# Patient Record
Sex: MALE | Race: WHITE | NOT HISPANIC OR LATINO | Employment: OTHER | URBAN - METROPOLITAN AREA
[De-identification: names, ages, dates, MRNs, and addresses within clinical notes are randomized per-mention and may not be internally consistent; named-entity substitution may affect disease eponyms.]

---

## 2019-02-27 ENCOUNTER — OCCUPATIONAL HEALTH (OUTPATIENT)
Dept: URGENT CARE | Facility: CLINIC | Age: 34
End: 2019-02-27

## 2019-02-27 PROCEDURE — 80305 PR DRUG SCREEN - 1: ICD-10-PCS | Mod: S$GLB,,, | Performed by: EMERGENCY MEDICINE

## 2019-02-27 PROCEDURE — 80305 DRUG TEST PRSMV DIR OPT OBS: CPT | Mod: S$GLB,,, | Performed by: EMERGENCY MEDICINE

## 2019-02-27 NOTE — PROGRESS NOTES
Drug screen   Problem: Patient Care Overview (Adult)  Goal: Plan of Care Review  Outcome: Ongoing (interventions implemented as appropriate)    10/12/17 1718   Coping/Psychosocial Response Interventions   Plan Of Care Reviewed With patient   Patient Care Overview   Progress improving         Problem: NPPV/CPAP (Adult)  Goal: Signs and Symptoms of Listed Potential Problems Will be Absent or Manageable (NPPV/CPAP)  Outcome: Ongoing (interventions implemented as appropriate)    10/12/17 1718   NPPV/CPAP   Problems Assessed (NPPV/CPAP) skin breakdown;hypoxia/hypoxemia   Problems Present (NPPV/CPAP) none

## 2019-04-08 ENCOUNTER — CLINICAL SUPPORT (OUTPATIENT)
Dept: URGENT CARE | Facility: CLINIC | Age: 34
End: 2019-04-08

## 2019-04-08 PROCEDURE — 99499 PR PHYSICAL - DOT/CDL: ICD-10-PCS | Mod: S$GLB,,, | Performed by: NURSE PRACTITIONER

## 2019-04-08 PROCEDURE — 99499 UNLISTED E&M SERVICE: CPT | Mod: S$GLB,,, | Performed by: NURSE PRACTITIONER

## 2019-10-12 ENCOUNTER — CLINICAL SUPPORT (OUTPATIENT)
Dept: URGENT CARE | Facility: CLINIC | Age: 34
End: 2019-10-12

## 2019-10-12 PROCEDURE — 80305 DRUG TEST PRSMV DIR OPT OBS: CPT | Mod: S$GLB,,, | Performed by: EMERGENCY MEDICINE

## 2019-10-12 PROCEDURE — 80305 PR DRUG SCREEN - 1: ICD-10-PCS | Mod: S$GLB,,, | Performed by: EMERGENCY MEDICINE

## 2020-07-10 ENCOUNTER — OCCUPATIONAL HEALTH (OUTPATIENT)
Dept: URGENT CARE | Facility: CLINIC | Age: 35
End: 2020-07-10

## 2020-07-10 DIAGNOSIS — Z02.89 ENCOUNTER FOR PHYSICAL EXAMINATION RELATED TO EMPLOYMENT: ICD-10-CM

## 2020-07-10 PROCEDURE — 99499 COAST GUARD PHYSICAL: ICD-10-PCS | Mod: S$GLB,,, | Performed by: NURSE PRACTITIONER

## 2020-07-10 PROCEDURE — 80305 PR DRUG SCREEN - 1: ICD-10-PCS | Mod: S$GLB,,, | Performed by: EMERGENCY MEDICINE

## 2020-07-10 PROCEDURE — 99499 UNLISTED E&M SERVICE: CPT | Mod: S$GLB,,, | Performed by: NURSE PRACTITIONER

## 2020-07-10 PROCEDURE — 80305 DRUG TEST PRSMV DIR OPT OBS: CPT | Mod: S$GLB,,, | Performed by: EMERGENCY MEDICINE

## 2020-12-08 ENCOUNTER — HOSPITAL ENCOUNTER (OUTPATIENT)
Facility: HOSPITAL | Age: 35
Discharge: HOME | End: 2020-12-09
Attending: EMERGENCY MEDICINE | Admitting: INTERNAL MEDICINE
Payer: COMMERCIAL

## 2020-12-08 DIAGNOSIS — I63.9 STROKE: ICD-10-CM

## 2020-12-08 DIAGNOSIS — G45.9 TIA (TRANSIENT ISCHEMIC ATTACK): ICD-10-CM

## 2020-12-08 DIAGNOSIS — G45.9 TRANSIENT ISCHEMIC ATTACK: Primary | ICD-10-CM

## 2020-12-08 LAB
ALBUMIN SERPL BCP-MCNC: 4.3 G/DL (ref 3.5–5.2)
ALP SERPL-CCNC: 38 U/L (ref 55–135)
ALT SERPL W/O P-5'-P-CCNC: 22 U/L (ref 10–44)
ANION GAP SERPL CALC-SCNC: 7 MMOL/L (ref 8–16)
AST SERPL-CCNC: 25 U/L (ref 10–40)
BASOPHILS # BLD AUTO: 0.03 K/UL (ref 0–0.2)
BASOPHILS NFR BLD: 0.3 % (ref 0–1.9)
BILIRUB SERPL-MCNC: 0.6 MG/DL (ref 0.1–1)
BUN SERPL-MCNC: 20 MG/DL (ref 6–20)
CALCIUM SERPL-MCNC: 9 MG/DL (ref 8.7–10.5)
CHLORIDE SERPL-SCNC: 104 MMOL/L (ref 95–110)
CHOLEST SERPL-MCNC: 243 MG/DL (ref 120–199)
CHOLEST/HDLC SERPL: 6.6 {RATIO} (ref 2–5)
CO2 SERPL-SCNC: 27 MMOL/L (ref 23–29)
CREAT SERPL-MCNC: 1.3 MG/DL (ref 0.5–1.4)
CREAT SERPL-MCNC: 1.3 MG/DL (ref 0.5–1.4)
DIFFERENTIAL METHOD: ABNORMAL
EOSINOPHIL # BLD AUTO: 0.1 K/UL (ref 0–0.5)
EOSINOPHIL NFR BLD: 1.2 % (ref 0–8)
ERYTHROCYTE [DISTWIDTH] IN BLOOD BY AUTOMATED COUNT: 12.1 % (ref 11.5–14.5)
EST. GFR  (AFRICAN AMERICAN): >60 ML/MIN/1.73 M^2
EST. GFR  (NON AFRICAN AMERICAN): >60 ML/MIN/1.73 M^2
GLUCOSE SERPL-MCNC: 106 MG/DL (ref 70–110)
GLUCOSE SERPL-MCNC: 90 MG/DL (ref 70–110)
HCT VFR BLD AUTO: 41.3 % (ref 40–54)
HDLC SERPL-MCNC: 37 MG/DL (ref 40–75)
HDLC SERPL: 15.2 % (ref 20–50)
HGB BLD-MCNC: 13.5 G/DL (ref 14–18)
IMM GRANULOCYTES # BLD AUTO: 0.03 K/UL (ref 0–0.04)
IMM GRANULOCYTES NFR BLD AUTO: 0.3 % (ref 0–0.5)
INR PPP: 1.2
LDLC SERPL CALC-MCNC: 160.4 MG/DL (ref 63–159)
LYMPHOCYTES # BLD AUTO: 3.1 K/UL (ref 1–4.8)
LYMPHOCYTES NFR BLD: 34.9 % (ref 18–48)
MCH RBC QN AUTO: 29.2 PG (ref 27–31)
MCHC RBC AUTO-ENTMCNC: 32.7 G/DL (ref 32–36)
MCV RBC AUTO: 89 FL (ref 82–98)
MONOCYTES # BLD AUTO: 0.9 K/UL (ref 0.3–1)
MONOCYTES NFR BLD: 9.7 % (ref 4–15)
NEUTROPHILS # BLD AUTO: 4.8 K/UL (ref 1.8–7.7)
NEUTROPHILS NFR BLD: 53.6 % (ref 38–73)
NONHDLC SERPL-MCNC: 206 MG/DL
NRBC BLD-RTO: 0 /100 WBC
PLATELET # BLD AUTO: 266 K/UL (ref 150–350)
PMV BLD AUTO: 9.8 FL (ref 9.2–12.9)
POTASSIUM SERPL-SCNC: 3.9 MMOL/L (ref 3.5–5.1)
PROT SERPL-MCNC: 7.2 G/DL (ref 6–8.4)
PROTHROMBIN TIME: 14.3 SEC (ref 10.6–14.8)
RBC # BLD AUTO: 4.63 M/UL (ref 4.6–6.2)
SAMPLE: NORMAL
SARS-COV-2 RDRP RESP QL NAA+PROBE: NEGATIVE
SODIUM SERPL-SCNC: 138 MMOL/L (ref 136–145)
TRIGL SERPL-MCNC: 228 MG/DL (ref 30–150)
TSH SERPL DL<=0.005 MIU/L-ACNC: 4.56 UIU/ML (ref 0.34–5.6)
WBC # BLD AUTO: 8.93 K/UL (ref 3.9–12.7)

## 2020-12-08 PROCEDURE — 25500020 PHARM REV CODE 255: Performed by: EMERGENCY MEDICINE

## 2020-12-08 PROCEDURE — 80061 LIPID PANEL: CPT

## 2020-12-08 PROCEDURE — 84443 ASSAY THYROID STIM HORMONE: CPT

## 2020-12-08 PROCEDURE — U0002 COVID-19 LAB TEST NON-CDC: HCPCS

## 2020-12-08 PROCEDURE — 85610 PROTHROMBIN TIME: CPT

## 2020-12-08 PROCEDURE — 93010 ELECTROCARDIOGRAM REPORT: CPT | Mod: ,,, | Performed by: INTERNAL MEDICINE

## 2020-12-08 PROCEDURE — 85025 COMPLETE CBC W/AUTO DIFF WBC: CPT

## 2020-12-08 PROCEDURE — 25000003 PHARM REV CODE 250: Performed by: EMERGENCY MEDICINE

## 2020-12-08 PROCEDURE — 93005 ELECTROCARDIOGRAM TRACING: CPT | Performed by: INTERNAL MEDICINE

## 2020-12-08 PROCEDURE — 80053 COMPREHEN METABOLIC PANEL: CPT

## 2020-12-08 PROCEDURE — 99285 EMERGENCY DEPT VISIT HI MDM: CPT | Mod: 25

## 2020-12-08 PROCEDURE — 93010 EKG 12-LEAD: ICD-10-PCS | Mod: ,,, | Performed by: INTERNAL MEDICINE

## 2020-12-08 PROCEDURE — 82962 GLUCOSE BLOOD TEST: CPT

## 2020-12-08 RX ORDER — NAPROXEN SODIUM 220 MG/1
162 TABLET, FILM COATED ORAL
Status: COMPLETED | OUTPATIENT
Start: 2020-12-08 | End: 2020-12-08

## 2020-12-08 RX ADMIN — IOHEXOL 100 ML: 350 INJECTION, SOLUTION INTRAVENOUS at 10:12

## 2020-12-08 RX ADMIN — ASPIRIN 162 MG: 81 TABLET, CHEWABLE ORAL at 10:12

## 2020-12-09 ENCOUNTER — CLINICAL SUPPORT (OUTPATIENT)
Dept: CARDIOLOGY | Facility: HOSPITAL | Age: 35
End: 2020-12-09
Attending: PSYCHIATRY & NEUROLOGY
Payer: COMMERCIAL

## 2020-12-09 VITALS
HEIGHT: 71 IN | BODY MASS INDEX: 36.82 KG/M2 | RESPIRATION RATE: 18 BRPM | HEART RATE: 62 BPM | OXYGEN SATURATION: 98 % | WEIGHT: 263 LBS | SYSTOLIC BLOOD PRESSURE: 142 MMHG | DIASTOLIC BLOOD PRESSURE: 52 MMHG | TEMPERATURE: 98 F

## 2020-12-09 PROBLEM — F12.90 MARIJUANA USE: Status: ACTIVE | Noted: 2020-12-09

## 2020-12-09 PROBLEM — E66.01 SEVERE OBESITY (BMI >= 40): Status: ACTIVE | Noted: 2020-12-09

## 2020-12-09 PROBLEM — E78.5 HYPERLIPIDEMIA: Status: ACTIVE | Noted: 2020-12-09

## 2020-12-09 PROBLEM — G45.9 TRANSIENT ISCHEMIC ATTACK: Status: ACTIVE | Noted: 2020-12-09

## 2020-12-09 PROBLEM — G45.9 TIA (TRANSIENT ISCHEMIC ATTACK): Status: ACTIVE | Noted: 2020-12-09

## 2020-12-09 LAB
APTT PPP: 31.8 SEC (ref 23.6–33.3)
CK MB SERPL-MCNC: 4.4 NG/ML (ref 0.1–6.5)
ESTIMATED AVG GLUCOSE: 114 MG/DL (ref 68–131)
HBA1C MFR BLD HPLC: 5.6 % (ref 4.5–6.2)
TROPONIN I SERPL DL<=0.01 NG/ML-MCNC: <0.03 NG/ML

## 2020-12-09 PROCEDURE — 25000003 PHARM REV CODE 250: Performed by: NURSE PRACTITIONER

## 2020-12-09 PROCEDURE — 83036 HEMOGLOBIN GLYCOSYLATED A1C: CPT

## 2020-12-09 PROCEDURE — 84484 ASSAY OF TROPONIN QUANT: CPT

## 2020-12-09 PROCEDURE — 97165 OT EVAL LOW COMPLEX 30 MIN: CPT

## 2020-12-09 PROCEDURE — 97116 GAIT TRAINING THERAPY: CPT

## 2020-12-09 PROCEDURE — 82553 CREATINE MB FRACTION: CPT

## 2020-12-09 PROCEDURE — 97161 PT EVAL LOW COMPLEX 20 MIN: CPT

## 2020-12-09 PROCEDURE — 93306 ECHO (CUPID ONLY): ICD-10-PCS | Mod: 26,,, | Performed by: INTERNAL MEDICINE

## 2020-12-09 PROCEDURE — 93306 TTE W/DOPPLER COMPLETE: CPT

## 2020-12-09 PROCEDURE — 93306 TTE W/DOPPLER COMPLETE: CPT | Mod: 26,,, | Performed by: INTERNAL MEDICINE

## 2020-12-09 PROCEDURE — G0378 HOSPITAL OBSERVATION PER HR: HCPCS

## 2020-12-09 PROCEDURE — 97535 SELF CARE MNGMENT TRAINING: CPT

## 2020-12-09 PROCEDURE — 85730 THROMBOPLASTIN TIME PARTIAL: CPT

## 2020-12-09 RX ORDER — ASPIRIN 325 MG
325 TABLET, DELAYED RELEASE (ENTERIC COATED) ORAL DAILY
Status: DISCONTINUED | OUTPATIENT
Start: 2020-12-09 | End: 2020-12-09 | Stop reason: HOSPADM

## 2020-12-09 RX ORDER — ATORVASTATIN CALCIUM 40 MG/1
40 TABLET, FILM COATED ORAL DAILY
Status: DISCONTINUED | OUTPATIENT
Start: 2020-12-09 | End: 2020-12-09 | Stop reason: HOSPADM

## 2020-12-09 RX ORDER — ASPIRIN 325 MG
325 TABLET ORAL DAILY
COMMUNITY

## 2020-12-09 RX ORDER — ONDANSETRON 2 MG/ML
4 INJECTION INTRAMUSCULAR; INTRAVENOUS EVERY 8 HOURS PRN
Status: DISCONTINUED | OUTPATIENT
Start: 2020-12-09 | End: 2020-12-09 | Stop reason: HOSPADM

## 2020-12-09 RX ORDER — SODIUM CHLORIDE 0.9 % (FLUSH) 0.9 %
10 SYRINGE (ML) INJECTION
Status: DISCONTINUED | OUTPATIENT
Start: 2020-12-09 | End: 2020-12-09 | Stop reason: HOSPADM

## 2020-12-09 RX ADMIN — ASPIRIN 325 MG: 325 TABLET, COATED ORAL at 09:12

## 2020-12-09 RX ADMIN — ATORVASTATIN CALCIUM 40 MG: 40 TABLET, FILM COATED ORAL at 09:12

## 2020-12-09 NOTE — PT/OT/SLP EVAL
Physical Therapy Evaluation and Discharge Note    Patient Name:  Sharif Olsen   MRN:  [id number]    Recommendations:     Discharge Recommendations:  home   Discharge Equipment Recommendations: none   Barriers to discharge: None    Assessment:     Sharif Olsen is a 35 y.o. male admitted with a medical diagnosis of TIA (transient ischemic attack). Pt supine in NAD in ED. Agreeable to PT eval and treatment; states resolution of R arm weakness and numbness.  At this time, patient is functioning at their prior level of function and does not require further acute PT services.     Recent Surgery: * No surgery found *      Plan:     During this hospitalization, patient does not require further acute PT services.  Please re-consult if situation changes.      Subjective     Chief Complaint: none  Patient/Family Comments/goals: home today  Pain/Comfort:  · Pain Rating 1: 0/10  · Pain Rating Post-Intervention 1: 0/10    Patients cultural, spiritual, Hinduism conflicts given the current situation:      Living Environment:  Pt lives with wife and kids in raised home with 15 steps to enter with HR; pt denies difficulty with ascending and descending and does not anticipate difficulty at discharge.   Prior to admission, patients level of function was Independent, driving and working.  Equipment used at home: none.  DME owned (not currently used): none.  Upon discharge, patient will have assistance from family.    Objective:     Communicated with RN and OT prior to session.  Patient found supine with blood pressure cuff, pulse ox (continuous), telemetry upon PT entry to room.    General Precautions: Standard, fall   Orthopedic Precautions:    Braces:       Exams:  · Cognitive Exam:  Patient is oriented to Person, Place, Time and Situation  · Gross Motor Coordination:  WFL  · RUE ROM: WNL  · RUE Strength: WNL  · LUE ROM: WNL  · LUE Strength: WNL  · RLE ROM: WNL  · RLE Strength: WNL  · LLE ROM: WNL  · LLE Strength: WNL    Functional  Mobility:  · Bed Mobility:     · Rolling Right: independence  · Supine to Sit: independence  · Transfers:     · Sit to Stand:  independence with no AD  · Gait: 30'  in room without AD Indep; no symptoms  · Balance: WNL in sitting; WNL in static stand, dynamic stand, and static stand with perturbations    AM-PAC 6 CLICK MOBILITY  Total Score:24       Therapeutic Activities and Exercises:   bed mobility; sitting EOB for trunk control and midline orientation; transfer training; PT educated pt/ family on importance of out of bed to chair and functional mobility to negate negative effects of prolonged bed rest. edu on signs and symptoms of CVA and ACT FAST for best prognosis.       AM-PAC 6 CLICK MOBILITY  Total Score:24     Patient left sitting at EOB with all lines intact, call button in reach and RN notified.    GOALS:   Multidisciplinary Problems     Physical Therapy Goals     Not on file                History:     History reviewed. No pertinent past medical history.    History reviewed. No pertinent surgical history.    Time Tracking:     PT Received On: 12/09/20  PT Start Time: 1015     PT Stop Time: 1026  PT Total Time (min): 11 min     Billable Minutes: Evaluation 3 and Gait Training 8      Yelitza Mcgill, PT  12/09/2020

## 2020-12-09 NOTE — CONSULTS
[facility]  Neurology  Consult Note    Patient Name: Sharif Olsen  MRN: [id number]  Admission Date: 12/8/2020  Hospital Length of Stay: 0 days  Code Status: Full Code   Attending Provider: No att. providers found   Consulting Provider: Terese Kelly NP  Primary Care Physician: Primary Doctor No  Principal Problem:TIA (transient ischemic attack)    Consults  Subjective:     HISTORY OF PRESENT ILLNESS: per emr   Sharif Olsen is a 35 y.o. old  male who  has no past medical history on file.. The patient presented to [facility] on 12/8/2020 with a primary complaint of Arm Numbness (reports right arm numbess approx 1 hour ago that resolved PTA. Denies weakness, headache, cp, SOB or blurred vision at present. pt reports taking 2 baby aspirin PTA. )        35-year-old male presented emergency department with right upper extremity weakness and numbness and could not  the right arm about 1 hour PTA and the symptoms lasted few minutes and spontaneously resolved.       Patient said he felt funny in his head and also felt funny in his right lower extremity when he had weakness in the right upper extremity.  Patient said he took two baby aspirins.  Patient denies any other complaints.       Patient states he is back to normal at this time and has no weakness or numbness at this time.  Denies headache or nausea vomiting or chest pain or shortness of breath or abdominal pain.  Denies trauma.  Denies any residual symptoms at this time.       Patient said he smokes and use marijuana daily and he has family history of strokes and ASD in his father and daughter      Neurological Consult: Pt seen and examined in ED.     History reviewed. No pertinent past medical history.    History reviewed. No pertinent surgical history.    Review of patient's allergies indicates:  No Known Allergies    Current Neurological Medications: none    No current facility-administered medications on file prior to  encounter.      Current Outpatient Medications on File Prior to Encounter   Medication Sig    aspirin 325 MG tablet Take 325 mg by mouth once daily.      Family History     Problem Relation (Age of Onset)    Atrial Septal Defect Father    Hypertension Mother, Father    Stroke Father        Tobacco Use    Smoking status: Former Smoker   Substance and Sexual Activity    Alcohol use: Not Currently    Drug use: Yes     Types: Marijuana     Comment: daily    Sexual activity: Not on file     Review of Systems   Neurological: Positive for numbness.   All other systems reviewed and are negative.    Objective:     Vital Signs (Most Recent):  Temp: 98.3 °F (36.8 °C) (12/09/20 1450)  Pulse: 62 (12/09/20 1450)  Resp: 18 (12/09/20 1450)  BP: (!) 142/52 (12/09/20 1450)  SpO2: 98 % (12/09/20 1450) Vital Signs (24h Range):  Temp:  [98.3 °F (36.8 °C)-98.7 °F (37.1 °C)] 98.3 °F (36.8 °C)  Pulse:  [54-67] 62  Resp:  [17-18] 18  SpO2:  [96 %-99 %] 98 %  BP: (130-154)/() 142/52     Weight: 119.3 kg (263 lb)  Body mass index is 36.68 kg/m².    Physical Exam  HENT:      Head: Normocephalic.      Nose: Nose normal.      Mouth/Throat:      Mouth: Mucous membranes are moist.   Eyes:      Pupils: Pupils are equal, round, and reactive to light.   Neck:      Musculoskeletal: Normal range of motion.   Pulmonary:      Effort: Pulmonary effort is normal.   Musculoskeletal: Normal range of motion.   Skin:     General: Skin is warm.   Neurological:      General: No focal deficit present.      Mental Status: He is alert and oriented to person, place, and time.      Coordination: Finger-Nose-Finger Test normal.      Deep Tendon Reflexes: Strength normal.   Psychiatric:         Speech: Speech normal.         NEUROLOGICAL EXAMINATION:     MENTAL STATUS   Oriented to person, place, and time.   Follows 1 step commands.   Attention: normal. Concentration: normal.   Speech: speech is normal   Level of consciousness: alert  Able to name object.  Able to repeat.     CRANIAL NERVES   Cranial nerves II through XII intact.     CN III, IV, VI   Pupils are equal, round, and reactive to light.    MOTOR EXAM     Strength   Strength 5/5 throughout.     SENSORY EXAM   Light touch normal.     GAIT AND COORDINATION      Coordination   Finger to nose coordination: normal    Tremor   Resting tremor: absent       karl     NIH Stroke Scale:    Level of Consciousness: 0 - alert  LOC Questions: 0 - answers both correctly  LOC Commands: 0 - performs both correctly  Best Gaze: 0 - normal  Visual: 0 - no visual loss  Facial Palsy: 0 - normal  Motor Left Arm: 0 - no drift  Motor Right Arm: 0 - no drift  Motor Left Le - no drift  Motor Right Le - no drift  Limb Ataxia: 0 - absent  Sensory: 0 - normal  Best Language: 0 - no aphasia  Dysarthria: 0 - normal articulation  Extinction and Inattention: 0 - no neglect  NIH Stroke Scale Total: 0          Significant Labs:  Lab Results   Component Value Date    CHOL 243 (H) 2020     Lab Results   Component Value Date    HDL 37 (L) 2020     Lab Results   Component Value Date    LDLCALC 160.4 (H) 2020     Lab Results   Component Value Date    TRIG 228 (H) 2020     Lab Results   Component Value Date    CHOLHDL 15.2 (L) 2020     Lab Results   Component Value Date    HGBA1C 5.6 2020       Significant Imaging: MRI Brain Without Contrast  PROCEDURE:    MRI BRAIN WITHOUT CONTRAST  dated  2020 6:58 AM    INDICATION: TIA, initial exam    COMPARISON: CT head 2020    TECHNIQUE: Multiplanar, multisequential MR imaging was performed of  the brain without contrast.    FINDINGS:    There are no signal abnormalities on diffusion weighted imaging to  suggest acute infarct. Brain parenchymal signal is normal. There is no  intracranial hemorrhage or extra-axial fluid collection. Ventricles  and cortical sulci are normal in size for the patient's age. The  midline structures and craniocervical  junction are normal. The major  intracranial physiologic flow voids are present. There are no signal  abnormalities of the orbits, the paranasal sinuses or the skull base.    IMPRESSION:  Normal.    Electronically Signed by Ana Sapp on 12/9/2020 7:38 AM  X-Ray Chest AP Portable  PROCEDURE:   XR CHEST AP PORTABLE  dated  12/8/2020 10:49 PM    CLINICAL HISTORY:   Male 35 years of age.   chest pain    TECHNIQUE: AP view of the chest obtained portably at 10:49 PM.    PREVIOUS STUDIES:  None Available    FINDINGS:    Cardiac and mediastinal contours are normal. Lungs are clear. There is  no pleural effusion or pneumothorax. Bones are unremarkable.    IMPRESSION:    No acute findings. Clear lungs. Normal size heart.    Electronically Signed by Ana Sapp on 12/9/2020 6:41 AM        Assessment and Plan:    RA numbness-resolved  Clinical TIA  -Ct head: negative  -MRI brain: normal  -CTA head and neck: unremarkable  -ECHO: pending results  -Lipids: ordered  -A1c: ordered    PLAN: work up in progress. Rec asa and statin for stroke prevention.        [phone] within 3 days from discharge.     Stroke education was provided including stroke risk factors modification and any acute neurological changes including weakness, confusion, visual changes to come straight to the ER.     All questions were answered.                                Active Diagnoses:    Diagnosis Date Noted POA    PRINCIPAL PROBLEM:  TIA (transient ischemic attack) [G45.9] 12/09/2020 Yes    Hyperlipidemia [E78.5] 12/09/2020 Yes    Severe obesity (BMI >= 40) [E66.01] 12/09/2020 Yes    Marijuana use [F12.90] 12/09/2020 Yes    Transient ischemic attack [G45.9] 12/09/2020 Yes      Problems Resolved During this Admission:       VTE Risk Mitigation (From admission, onward)         Ordered     IP VTE HIGH RISK PATIENT  Once      12/09/20 0031     Place sequential compression device  Until  discontinued      12/09/20 0031              Critical Care:  Greater than 30 minutes of critical care time has been spent evaluating with this patient. Time includes chart review not limited to diagnostic imaging, labs, and vitals, pt assessment, discussion with medical staff and nursing, current order evals, and new order entries.     Thank you for your consult. I will follow-up with patient. Please contact us if you have any additional questions.    Terese Kelly NP  Neurology  [facility]

## 2020-12-09 NOTE — ED NOTES
Patient left AMA. Follow up instructions discussed with patient. Patient verbalized understanding. All questions and concerns answered. No needs expressed at the time. Pt is awake, alert and oriented with no acute distress noted. Respirations even and unlabored. Ambulatory out of ED.

## 2020-12-09 NOTE — PT/OT/SLP EVAL
Occupational Therapy   Evaluation and Discharge Note    Name: Sharif Olsen  MRN: [id number]  Admitting Diagnosis:  TIA (transient ischemic attack)      Recommendations:     Discharge Recommendations: home  Discharge Equipment Recommendations:  none  Barriers to discharge:  None    Assessment:     Sharif Olsen is a 35 y.o. male with a medical diagnosis of TIA (transient ischemic attack). At this time, patient is functioning at their prior level of function and does not require further acute OT services.     Plan:     During this hospitalization, patient does not require further acute OT services.  Please re-consult if situation changes.    · Plan of Care Reviewed with: patient    Subjective     Chief Complaint: Right UE numbness and weakness.  Patient/Family Comments/goals: None, right UE numbness and weakness now resolved.    Occupational Profile:  Living Environment: lives with spouse and 3 children in a 2 story home with 15 steps to enter 1st floor.  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  Roles and Routines: primary homemaker  Equipment Used at home:  none  Assistance upon Discharge: Spouse    Pain/Comfort:  · Pain Rating 1: 0/10  · Pain Rating Post-Intervention 1: 0/10    Patients cultural, spiritual, Baptism conflicts given the current situation: no    Objective:     Communicated with: nurse prior to session.  Patient found HOB elevated with peripheral IV, telemetry upon OT entry to room.    General Precautions: Standard, (None)   Orthopedic Precautions:N/A   Braces: N/A     Occupational Performance:    Bed Mobility:    · Patient completed Scooting/Bridging with independence  · Patient completed Supine to Sit with independence  · Patient completed Sit to Supine with independence    Functional Mobility/Transfers:  · Patient completed Sit <> Stand Transfer with independence  with  no assistive device   · Functional Mobility: ambulated 15  feet in the ER room independently with no AD.    Activities of Daily Living:  · Grooming: independence to wash face sitting EOB.  · Lower Body Dressing: independence to don/doff socks sitting EOB.    Cognitive/Visual Perceptual:  Cognitive/Psychosocial Skills:     -       Oriented to: Person, Place, Time and Situation   -       Follows Commands/attention:Follows multistep  commands  -       Communication: clear/fluent  -       Memory: No Deficits noted  -       Safety awareness/insight to disability: intact   -       Mood/Affect/Coping skills/emotional control: Cooperative and Pleasant  Visual/Perceptual:      -Intact Acuity    Physical Exam:  Balance:    -       Sitting/Standing: WFL  Upper Extremity Range of Motion:     -       Right Upper Extremity: WFL  -       Left Upper Extremity: WFL  Upper Extremity Strength:    -       Right Upper Extremity: WFL  -       Left Upper Extremity: WFL   Strength:    -       Right Upper Extremity: WFL  -       Left Upper Extremity: WFL  Fine Motor Coordination:    -       Intact    AMPAC 6 Click ADL:  AMPAC Total Score: 24    Treatment & Education:  Patient is currently independent with all sitting/standing ADL activity with no safety concerns.  Education:    Patient left HOB elevated with all lines intact and call button in reach    GOALS:   Multidisciplinary Problems     Occupational Therapy Goals     Not on file                History:     History reviewed. No pertinent past medical history.    History reviewed. No pertinent surgical history.    Time Tracking:     OT Date of Treatment:    OT Start Time: 0945  OT Stop Time: 0959  OT Total Time (min): 14 min    Billable Minutes:Evaluation 5  Self Care/Home Management 9    Severiano Grande OT  12/9/2020

## 2020-12-09 NOTE — HOSPITAL COURSE
"12/9/2020  The right arm and neck numbness have resolved and he is feeling"ok". Per the patient has been seen by Neurology and PT.  Mr Olsen has decided to sign out against medical advice and clearly understands the risk of organ failure, loss of organs, disability and or death. He is aware that the echocardiogram has been done and the result is pending; also that he does have a room assignment.  Pt appears to be in no distress and just want to go now.    "

## 2020-12-09 NOTE — ED PROVIDER NOTES
Encounter Date: 12/8/2020       History     Chief Complaint   Patient presents with    Arm Numbness     reports right arm numbess approx 1 hour ago that resolved PTA. Denies weakness, headache, cp, SOB or blurred vision at present. pt reports taking 2 baby aspirin PTA.      35-year-old male presented emergency department with right upper extremity weakness and numbness and could not  the right arm about 1 hour ago and the symptoms lasted few minutes and spontaneously resolved.  Patient said he felt funny in his head and also felt funny in his right lower extremity when he had weakness in the right upper extremity.  Patient said he took 2 baby aspirins.  Patient denies any other complaints.  Patient states he is back to normal at this time and has no weakness or numbness at this time.  Denies headache or nausea vomiting or chest pain or shortness of breath or abdominal pain.  Denies any residual symptoms at this time.  Patient said he smokes and he has family history of strokes.        Review of patient's allergies indicates:  No Known Allergies  No past medical history on file.  No past surgical history on file.  No family history on file.  Social History     Tobacco Use    Smoking status: Not on file   Substance Use Topics    Alcohol use: Not on file    Drug use: Not on file     Review of Systems   Constitutional: Negative.    HENT: Negative.    Eyes: Negative.    Respiratory: Negative.    Cardiovascular: Negative.    Gastrointestinal: Negative.    Endocrine: Negative.    Genitourinary: Negative.    Musculoskeletal: Negative.    Skin: Negative.    Allergic/Immunologic: Negative.    Neurological: Positive for dizziness, weakness and numbness.   Hematological: Negative.    Psychiatric/Behavioral: Negative.    All other systems reviewed and are negative.      Physical Exam     Initial Vitals [12/08/20 2147]   BP Pulse Resp Temp SpO2   (!) 154/105 67 18 98.7 °F (37.1 °C) 98 %      MAP       --          Physical Exam    Nursing note and vitals reviewed.  Constitutional: He appears well-developed and well-nourished.   HENT:   Head: Normocephalic and atraumatic.   Nose: Nose normal.   Eyes: Conjunctivae and EOM are normal.   Neck: Normal range of motion. No tracheal deviation present.   Cardiovascular: Normal rate, regular rhythm, normal heart sounds and intact distal pulses. Exam reveals no friction rub.    No murmur heard.  Pulmonary/Chest: Breath sounds normal. No respiratory distress. He has no wheezes. He has no rales.   Abdominal: Soft. He exhibits no distension. There is no abdominal tenderness.   Musculoskeletal: Normal range of motion.   Neurological: He is alert and oriented to person, place, and time. He has normal strength. He displays normal reflexes. No cranial nerve deficit or sensory deficit. GCS score is 15. GCS eye subscore is 4. GCS verbal subscore is 5. GCS motor subscore is 6.   Skin: Skin is warm and dry. Capillary refill takes less than 2 seconds.   Psychiatric: He has a normal mood and affect. Thought content normal.         ED Course   Procedures  Labs Reviewed   CBC W/ AUTO DIFFERENTIAL - Abnormal; Notable for the following components:       Result Value    Hemoglobin 13.5 (*)     All other components within normal limits   COMPREHENSIVE METABOLIC PANEL - Abnormal; Notable for the following components:    Anion Gap 7 (*)     All other components within normal limits   PROTIME-INR   TSH   LIPID PANEL   SARS-COV-2 RNA AMPLIFICATION, QUAL   POCT GLUCOSE   ISTAT CREATININE   POCT GLUCOSE MONITORING CONTINUOUS   POCT CREATININE     EKG Readings: (Independently Interpreted)   Rhythm: Normal Sinus Rhythm. Ectopy: No Ectopy. Conduction: Normal. ST Segments: Normal ST Segments. T Waves: Normal. Clinical Impression: Normal Sinus Rhythm       Imaging Results          CTA Head and Neck (xpd) (In process)                X-Ray Chest AP Portable (In process)    Procedure changed from X-Ray Chest 1  View                CT Head Without Contrast (Final result)  Result time 12/08/20 22:02:00    Final result by Presley Duffy MD (12/08/20 22:02:00)                 Narrative:    EXAM DESCRIPTION: CT HEAD WITHOUT CONTRAST 12/8/2020 10:16 PM CST    CLINICAL HISTORY: 35 years, Male, Neuro deficit, acute, stroke suspected; Arm Numbness?(reports right arm numbess approx 1 hour ago that resolved PTA. Denies weakness, headache, cp, SOB or blurred vision at present. pt reports taking 2 baby aspirin PTA. )    COMPARISON: None.    FINDINGS:    Multiple transaxial tomograms of the brain were obtained from the base of the skull to the vertex without contrast. 2-D multiplanar reformats and the coronal and sagittal plane were performed and reviewed.  An individualized dose optimization technique, Automated Exposure Control, was utilized for the performed procedure.    Brain parenchyma as well as the gray and white matter differentiation demonstrate to be unremarkable. There is no midline shift and/or mass effect. There is no evidence for acute hemorrhage and/or evidence for acute infarction. No significant abnormal ossifications.  Lateral ventricles and cisterns displace normal appearance.  No intra or extra axial fluid collections were seen. The calvarium is intact with no evidence for fractures. The visualized portions of the paranasal sinuses and orbits demonstrate to be clear.    IMPRESSION:    NO ACUTE INTRACRANIAL HEMORRHAGE. GROSSLY UNREMARKABLE CT SCAN OF THE HEAD WITHOUT CONTRAST.    Electronically signed by:  Presley Duffy MD  12/8/2020 10:17 PM [facility] Workstation: [id number]                               Medical Decision Making:   Initial Assessment:   Critical care time not documented as patient did not meet criteria as patient not a candidate for tPA and symptoms completely resolved when patient arrived  Differential Diagnosis:   Patient with the symptoms of transient ischemic attack.  Symptoms completely resolved at  this time.  Case discussed with Dr. RYNE Kowalski.  CT scan unremarkable.  EKG unremarkable and as recommended by Dr. RYNE Kowalski CT of the head done and CTA done.  Screening labs reviewed and as patient completely asymptomatic other half of aspirin given in the emergency department.  Patient not a candidate for tPA as symptoms completely resolved and stroke score is 0 at this time.  Hospital Medicine consulted for evaluation  Clinical Tests:   Lab Tests: Reviewed  Radiological Study: Reviewed  Medical Tests: Reviewed                             Clinical Impression:       ICD-10-CM ICD-9-CM   1. Transient ischemic attack  G45.9 435.9   2. TIA (transient ischemic attack)  G45.9 435.9                          ED Disposition Condition    Admit                             Keny Pittman MD  12/08/20 2227       Keny Pittman MD  12/08/20 224

## 2020-12-09 NOTE — H&P
[facility] Medicine History & Physical Examination   Patient Name: Sharif Olsen  MRN: [id number]  Patient Class: OP- Observation   Admission Date: 12/8/2020  9:50 PM  Length of Stay: 0  Attending Physician:   Primary Care Provider: Primary Doctor No  Face-to-Face encounter date: 12/09/2020  Code Status:Full Code  MPOA:  Chief Complaint: Arm Numbness (reports right arm numbess approx 1 hour ago that resolved PTA. Denies weakness, headache, cp, SOB or blurred vision at present. pt reports taking 2 baby aspirin PTA. )        Patient information was obtained from patient, past medical records and ER records.   HISTORY OF PRESENT ILLNESS:   Sharif Olsen is a 35 y.o. old  male who  has no past medical history on file.. The patient presented to [facility] on 12/8/2020 with a primary complaint of Arm Numbness (reports right arm numbess approx 1 hour ago that resolved PTA. Denies weakness, headache, cp, SOB or blurred vision at present. pt reports taking 2 baby aspirin PTA. )       35-year-old male presented emergency department with right upper extremity weakness and numbness and could not  the right arm about 1 hour PTA and the symptoms lasted few minutes and spontaneously resolved.      Patient said he felt funny in his head and also felt funny in his right lower extremity when he had weakness in the right upper extremity.  Patient said he took two baby aspirins.  Patient denies any other complaints.      Patient states he is back to normal at this time and has no weakness or numbness at this time.  Denies headache or nausea vomiting or chest pain or shortness of breath or abdominal pain.  Denies trauma.  Denies any residual symptoms at this time.      Patient said he smokes and use marijuana daily and he has family history of strokes and ASD in his father and daughter    REVIEW OF SYSTEMS:   10 Point Review of System was performed and was found to be negative except for that  "mentioned already in the HPI and   Review of Systems (Negative unless checked off)  Review of Systems   Constitutional: Negative.    HENT: Negative.    Eyes: Negative.    Respiratory: Negative.    Cardiovascular: Negative.    Gastrointestinal: Negative.    Genitourinary: Negative.    Musculoskeletal: Negative.    Skin: Negative.    Neurological: Positive for focal weakness and weakness.   Endo/Heme/Allergies: Negative.    Psychiatric/Behavioral: Negative.            PAST MEDICAL HISTORY:   History reviewed. No pertinent past medical history.    PAST SURGICAL HISTORY:   History reviewed. No pertinent surgical history.    ALLERGIES:   Patient has no known allergies.    FAMILY HISTORY:     Family History   Problem Relation Age of Onset    Hypertension Mother     Hypertension Father     Stroke Father     Atrial Septal Defect Father        SOCIAL HISTORY:     Social History     Tobacco Use    Smoking status: Former Smoker   Substance Use Topics    Alcohol use: Not Currently        Social History     Substance and Sexual Activity   Sexual Activity Not on file        HOME MEDICATIONS:     Prior to Admission medications    Not on File         PHYSICAL EXAM:   /76   Pulse 60   Temp 98.7 °F (37.1 °C) (Oral)   Resp 17   Ht 5' 11" (1.803 m)   Wt 119.3 kg (263 lb)   SpO2 99%   BMI 36.68 kg/m²   Vitals Reviewed  General appearance: Well-developed, well-nourished male in no apparent distress.  Skin: No Rash.   Neuro: Motor and sensory exams grossly intact. Good tone. Power in all 4 extremities 5/5.   HENT: Atraumatic head. Moist mucous membranes of oral cavity.  Eyes: Normal extraocular movements.   Neck: Supple. No evidence of lymphadenopathy. No thyroidomegaly.  Lungs: Clear to auscultation bilaterally. No wheezing present.   Heart: Regular rate and rhythm. S1 and S2 present with no murmurs/gallop/rub. No pedal edema. No JVD present.   Abdomen: Soft, non-distended, non-tender. No rebound tenderness/guarding. " No masses or organomegaly. Bowel sounds are normal. Bladder is not palpable.   Extremities: No cyanosis, clubbing, or edema.  Psych/mental status: Alert and oriented. Cooperative. Responds appropriately to questions.   EMERGENCY DEPARTMENT LABS AND IMAGING:   Following labs were Reviewed   Recent Labs   Lab 12/08/20  2214   WBC 8.93   HGB 13.5*   HCT 41.3      CALCIUM 9.0   ALBUMIN 4.3   PROT 7.2      K 3.9   CO2 27      BUN 20   CREATININE 1.3   ALKPHOS 38*   ALT 22   AST 25   BILITOT 0.6         BMP:   Recent Labs   Lab 12/08/20  2214   GLU 90      K 3.9      CO2 27   BUN 20   CREATININE 1.3   CALCIUM 9.0   , CMP   Recent Labs   Lab 12/08/20  2214      K 3.9      CO2 27   GLU 90   BUN 20   CREATININE 1.3   CALCIUM 9.0   PROT 7.2   ALBUMIN 4.3   BILITOT 0.6   ALKPHOS 38*   AST 25   ALT 22   ANIONGAP 7*   ESTGFRAFRICA >60.0   EGFRNONAA >60.0   , CBC   Recent Labs   Lab 12/08/20  2214   WBC 8.93   HGB 13.5*   HCT 41.3      , INR   Lab Results   Component Value Date    INR 1.2 12/08/2020   , Lipid Panel   Lab Results   Component Value Date    CHOL 243 (H) 12/08/2020    HDL 37 (L) 12/08/2020    LDLCALC 160.4 (H) 12/08/2020    TRIG 228 (H) 12/08/2020    CHOLHDL 15.2 (L) 12/08/2020   , Troponin No results for input(s): TROPONINI in the last 168 hours., A1C: No results for input(s): HGBA1C in the last 4320 hours. and All labs within the past 24 hours have been reviewed  Microbiology Results (last 7 days)     ** No results found for the last 168 hours. **        CTA Head and Neck (xpd)   Final Result      X-Ray Chest AP Portable   ED Interpretation   Chest x-ray unremarkable      CT Head Without Contrast   Final Result      MRI Brain Without Contrast    (Results Pending)     Ct Head Without Contrast    Result Date: 12/8/2020  EXAM DESCRIPTION: CT HEAD WITHOUT CONTRAST 12/8/2020 10:16 PM CST CLINICAL HISTORY: 35 years, Male, Neuro deficit, acute, stroke suspected; Arm  Numbness?(reports right arm numbess approx 1 hour ago that resolved PTA. Denies weakness, headache, cp, SOB or blurred vision at present. pt reports taking 2 baby aspirin PTA. ) COMPARISON: None. FINDINGS: Multiple transaxial tomograms of the brain were obtained from the base of the skull to the vertex without contrast. 2-D multiplanar reformats and the coronal and sagittal plane were performed and reviewed. An individualized dose optimization technique, Automated Exposure Control, was utilized for the performed procedure. Brain parenchyma as well as the gray and white matter differentiation demonstrate to be unremarkable. There is no midline shift and/or mass effect. There is no evidence for acute hemorrhage and/or evidence for acute infarction. No significant abnormal ossifications.  Lateral ventricles and cisterns displace normal appearance.  No intra or extra axial fluid collections were seen. The calvarium is intact with no evidence for fractures. The visualized portions of the paranasal sinuses and orbits demonstrate to be clear. IMPRESSION: NO ACUTE INTRACRANIAL HEMORRHAGE. GROSSLY UNREMARKABLE CT SCAN OF THE HEAD WITHOUT CONTRAST. Electronically signed by:  Presley Duffy MD  12/8/2020 10:17 PM CST Workstation: [id number]    Cta Head And Neck (xpd)    Result Date: 12/8/2020  EXAM DESCRIPTION: CTA HEAD AND NECK (XPD) 12/8/2020 11:10 PM CST CLINICAL HISTORY: 35 years, Male, Neuro deficit, acute, stroke suspected COMPARISON: None. PROCEDURE: Multiple transaxial tomograms from the aortic arch through the brain were performed after administration of a 100 cc of Omnipaque 350 at a rate of 4.0 cc/s for complete opacification of the carotid arteries and intracranial vessels. Subsequent 2-D and 3-D multiplanar reformats, volume rendering technique and maximum intensity projection images were generated and reviewed An individualized dose optimization technique, Automated Exposure Control, was utilized for the performed  procedure. FINDINGS: Ascending aorta:  There is a normal branching pattern of the great vessels off the arch.  There are left vertebral artery dominance which demonstrate normal opacification of both vessels with no evidence for significant occlusion and/or stenosis.  No great vessel origin stenosis is identified. Right carotid artery:  Normal opacification is demonstrated within the right common carotid artery and at the carotid bifurcation. The right internal carotid artery demonstrate to be within normal limits. There is no evidence for significant plaque formation and/or significant stenosis. Left carotid artery:  Normal opacification is demonstrated within the left common carotid artery and at the carotid bifurcation. The left internal carotid artery demonstrate to be within normal limits. There is no evidence for significant plaque formation and/or significant stenosis. Intracranial circulation: Intracranial portions of the internal carotid arteries the cavernous sinus portions demonstrates no focal areas of significant. There is normal opacification within the anterior circulation without evidence of intracranial aneurysm.  The middle cerebral arteries, anterior cerebral arteries and its branches demonstrate normal opacification with no evidence for significant stenosis aneurysm and/or occlusion. There is normal venous drainage. Vertebrobasilar system: The posterior circulation demonstrate left vertebral artery dominance with smaller caliber right vertebral artery. There is no evidence for significant the stenosis and/or evidence for significant dissection. The vertebrobasilar system and PCA demonstrate to be normal with no evidence for aneurysm and/or occlusion. Grossly the brain parenchyma demonstrate normal gray-white matter differentiation with no evidence for mass effect and/or midline shift. The skull base and intracranial structures demonstrate to be within normal limits. Lung apex: No gross  abnormalities are noted within the apices. IMPRESSION: UNREMARKABLE CTA INTRACRANIAL VESSELS WITH NO EVIDENCE FOR SIGNIFICANT ANEURYSM, STENOSIS AND/OR OCCLUSION. GROSSLY UNREMARKABLE COMMON AND INTERNAL CAROTID ARTERIES. LEFT VERTEBRAL ARTERY DOMINANCE WITH SMALLER CALIBER RIGHT VERTEBRAL ARTERY WITH NO EVIDENCE FOR DISSECTION AND/OR STENOSIS. GROSSLY UNREMARKABLE BRAIN WITH CONTRAST. Electronically signed by:  Presley Duffy MD  12/8/2020 11:22 PM [facility] Workstation: [id number]    12 lead EKG reveals a normal sinus rhythm with a normal axis this good R-wave progression this ST changes in the inferior leads II/III and AVF rate 63  millisecond  ASSESSMENT & PLAN:   Sharif Olsen is a 35 y.o. male admitted for    1. TIA  - Consult Neurology Dr. Rosendo Verdugo  - MRI of Brain  -Stroke protocol  -ASA/Statin  - PT/OT/ST eval and treat  -2 Decho with bubble    2. Family Hx of ASD/PFO  - bubble study ordered  -trend cardiac enzymes and troponin    3. Obesity-BMI:36.68  - F/U with PCP with discharge to engage in wt reduction plan    4. Daily Marijuana use  - recreation use only    DVT Prophylaxis: will be placed on SCDs for DVT prophylaxis and will be advised to be as mobile as possible and sit in a chair as tolerated.   ________________________________________________________________________________    Discharge Planning and Disposition: No mobility needs. Ambulating well. Good social support system. Patient will be discharged in   Face-to-Face encounter date: 12/09/2020  Encounter included review of the medical records, interviewing and examining the patient face-to-face, discussion with family and other health care providers including emergency medicine physician, admission orders, interpreting lab/test results and formulating a plan of care.   Medical Decision Making during this encounter was  [_] Low Complexity  [_] Moderate Complexity  [x] High  Complexity  _________________________________________________________________________________    INPATIENT LIST OF MEDICATIONS     Current Facility-Administered Medications:     aspirin EC tablet 325 mg, 325 mg, Oral, Daily, Kia Levy NP    atorvastatin tablet 40 mg, 40 mg, Oral, Daily, Kia Levy NP    ondansetron injection 4 mg, 4 mg, Intravenous, Q8H PRN, Kia Levy NP    sodium chloride 0.9% flush 10 mL, 10 mL, Intravenous, PRN, Kia Levy NP  No current outpatient medications on file.      Scheduled Meds:  Continuous Infusions:  PRN Meds:.      Kia Levy  [facility] Hospitalist NP  12/09/2020

## 2020-12-09 NOTE — DISCHARGE SUMMARY
"[facility] Medicine  Discharge Summary      Patient Name: Sharif Olsen  MRN: [id number]  Admission Date: 12/8/2020  Hospital Length of Stay: 0 days  Discharge Date and Time:  12/09/2020 3:08 PM  Attending Physician: No att. providers found   Discharging Provider: Héctor Chacko MD  Primary Care Provider: Primary Doctor No      HPI:   Sharif Olsen is a 35 y.o. old  male who  has no past medical history on file.. The patient presented to [facility] on 12/8/2020 with a primary complaint of Arm Numbness (reports right arm numbess approx 1 hour ago that resolved PTA. Denies weakness, headache, cp, SOB or blurred vision at present. pt reports taking 2 baby aspirin PTA. )        35-year-old male presented emergency department with right upper extremity weakness and numbness and could not  the right arm about 1 hour PTA and the symptoms lasted few minutes and spontaneously resolved.       Patient said he felt funny in his head and also felt funny in his right lower extremity when he had weakness in the right upper extremity.  Patient said he took two baby aspirins.  Patient denies any other complaints.       Patient states he is back to normal at this time and has no weakness or numbness at this time.  Denies headache or nausea vomiting or chest pain or shortness of breath or abdominal pain.  Denies trauma.  Denies any residual symptoms at this time.       Patient said he smokes and use marijuana daily and he has family history of strokes and ASD in his father and daughter    * No surgery found *      Hospital Course:   12/9/2020  The right arm and neck numbness have resolved and he is feeling"ok". Per the patient has been seen by Neurology and PT.  Mr Olsen has decided to sign out against medical advice and clearly understands the risk of organ failure, loss of organs, disability and or death. He is aware that the echocardiogram has been done and the result is pending; also " that he does have a room assignment.  Pt appears to be in no distress and just want to go now.       Consults:   Consults (From admission, onward)        Status Ordering Provider     Inpatient consult to Hospitalist  Once     Provider:  Kia Carrasco NP    Acknowledged LOURDES CARTER     Inpatient consult to neurology  Once     Provider:  Glen Verdugo MD    Acknowledged LOURDES CARTER     Inpatient consult to Neurology  Once     Provider:  Glen Verdugo MD    Acknowledged KIA CARRASCO     Inpatient consult to Registered Dietitian/Nutritionist  Once     Provider:  (Not yet assigned)    Acknowledged KIA CARRASCO     IP consult to case management/social work  Once     Provider:  (Not yet assigned)    Acknowledged KIA CARRASCO          No new Assessment & Plan notes have been filed under this hospital service since the last note was generated.  Service: Hospital Medicine    Final Active Diagnoses:    Diagnosis Date Noted POA    PRINCIPAL PROBLEM:  TIA (transient ischemic attack) [G45.9] 12/09/2020 Yes    Hyperlipidemia [E78.5] 12/09/2020 Yes    Severe obesity (BMI >= 40) [E66.01] 12/09/2020 Yes    Marijuana use [F12.90] 12/09/2020 Yes    Transient ischemic attack [G45.9] 12/09/2020 Yes      Problems Resolved During this Admission:       Discharged Condition: against medical advice    Disposition: Home or Self Care    Follow Up:  Follow-up Information     Glen Verdugo MD In 3 days.    Specialties: Vascular Neurology, Neurology  Contact information:  [address]  [address]  [place] [address]  [phone]                 Patient Instructions:      Diet Cardiac     Activity as tolerated       Significant Diagnostic Studies: Labs:   BMP:   Recent Labs   Lab 12/08/20  2214   GLU 90      K 3.9      CO2 27   BUN 20   CREATININE 1.3   CALCIUM 9.0   , CMP   Recent Labs   Lab 12/08/20  2214      K 3.9      CO2 27   GLU 90   BUN 20   CREATININE 1.3   CALCIUM 9.0   PROT 7.2   ALBUMIN 4.3    BILITOT 0.6   ALKPHOS 38*   AST 25   ALT 22   ANIONGAP 7*   ESTGFRAFRICA >60.0   EGFRNONAA >60.0   , CBC   Recent Labs   Lab 12/08/20  2214   WBC 8.93   HGB 13.5*   HCT 41.3      , Lipid Panel   Lab Results   Component Value Date    CHOL 243 (H) 12/08/2020    HDL 37 (L) 12/08/2020    LDLCALC 160.4 (H) 12/08/2020    TRIG 228 (H) 12/08/2020    CHOLHDL 15.2 (L) 12/08/2020   , A1C:   Recent Labs   Lab 12/09/20  0451   HGBA1C 5.6    and All labs within the past 24 hours have been reviewed    Pending Diagnostic Studies:     Procedure Component Value Units Date/Time    Echo Color Flow Doppler? Yes; Bubble Contrast? Yes [[id number]] Resulted: 12/09/20 1026    Order Status: Sent Lab Status: In process Updated: 12/09/20 1026     BSA 2.44 m2      TDI SEPTAL 0.15 m/s      LV LATERAL E/E' RATIO 2.95 m/s      LV SEPTAL E/E' RATIO 3.93 m/s      Right Atrial Pressure (from IVC) 3 mmHg      AORTIC VALVE CUSP SEPERATION 2.42 cm      TDI LATERAL 0.20 m/s      PV PEAK VELOCITY 103.64 cm/s      LVIDd 5.52 cm      IVS 0.98 cm      Posterior Wall 0.98 cm      Ao root annulus 3.14 cm      LVIDs 3.74 cm      FS 32 %      LV mass 208.86 g      LA size 3.66 cm      Left Ventricle Relative Wall Thickness 0.36 cm      AV mean gradient 3 mmHg      AV valve area 4.39 cm2      AV Velocity Ratio 97.77     AV index (prosthetic) 1.16     E/A ratio 1.00     Mean e' 0.18 m/s      E wave decelartion time 292.57 msec      LVOT diameter 2.20 cm      LVOT area 3.8 cm2      LVOT peak juan 121.24 m/s      LVOT peak VTI 26.23 cm      Ao peak juan 1.24 m/s      Ao VTI 22.68 cm      LVOT stroke volume 99.66 cm3      AV peak gradient 6 mmHg      TV rest pulmonary artery pressure 23 mmHg      E/E' ratio 3.37 m/s      MV Peak E Juan 0.59 m/s      TR Max Juan 2.22 m/s      MV Peak A Juan 0.59 m/s      LV Systolic Volume 60.13 mL      LV Systolic Volume Index 25.4 mL/m2      LV Diastolic Volume 129.88 mL      LV Diastolic Volume Index 54.82 mL/m2      LV Mass  Index 88 g/m2      Triscuspid Valve Regurgitation Peak Gradient 20 mmHg     Narrative:      · The estimated PA systolic pressure is 23 mmHg.       Impression:               Medications:  Reconciled Home Medications:      Medication List      CONTINUE taking these medications    aspirin 325 MG tablet  Take 325 mg by mouth once daily.            Indwelling Lines/Drains at time of discharge:   Lines/Drains/Airways     None                 Time spent on the discharge of patient: 16 minutes  Patient was seen and examined on the date of discharge and determined to be suitable for discharge.         Héctor Chacko MD  Department of Hospital Medicine  [facility]

## 2020-12-09 NOTE — PROGRESS NOTES
"[facility] Medicine  Progress Note    Patient Name: Sharif Olsen  MRN: [id number]  Patient Class: OP- Observation   Admission Date: 12/8/2020  Length of Stay: 0 days  Attending Physician: Héctor Chacko MD  Primary Care Provider: Primary Doctor No        Subjective:     Principal Problem:TIA (transient ischemic attack)        HPI:  Sharif Olsen is a 35 y.o. old  male who  has no past medical history on file.. The patient presented to [facility] on 12/8/2020 with a primary complaint of Arm Numbness (reports right arm numbess approx 1 hour ago that resolved PTA. Denies weakness, headache, cp, SOB or blurred vision at present. pt reports taking 2 baby aspirin PTA. )        35-year-old male presented emergency department with right upper extremity weakness and numbness and could not  the right arm about 1 hour PTA and the symptoms lasted few minutes and spontaneously resolved.       Patient said he felt funny in his head and also felt funny in his right lower extremity when he had weakness in the right upper extremity.  Patient said he took two baby aspirins.  Patient denies any other complaints.       Patient states he is back to normal at this time and has no weakness or numbness at this time.  Denies headache or nausea vomiting or chest pain or shortness of breath or abdominal pain.  Denies trauma.  Denies any residual symptoms at this time.       Patient said he smokes and use marijuana daily and he has family history of strokes and ASD in his father and daughter    Overview/Hospital Course:  12/9/2020  Th right arm and neck numbness have resolved and he is feeling"ok". Per the patient has been seen by Neurology and PT.    Interval History: Pt has had resolution of the right arm and neck numbness.    Review of Systems   Constitutional: Positive for fatigue.   HENT: Negative.    Eyes: Negative.    Respiratory: Negative.    Cardiovascular: Negative.    Gastrointestinal: " Negative.    Endocrine: Negative.    Genitourinary: Negative.    Musculoskeletal: Negative.    Skin: Negative.    Allergic/Immunologic: Negative.    Neurological: Negative.    Hematological: Negative.    Psychiatric/Behavioral: Negative.      Objective:     Vital Signs (Most Recent):  Temp: 98.7 °F (37.1 °C) (12/08/20 2147)  Pulse: (!) 54 (12/09/20 0530)  Resp: 18 (12/09/20 0530)  BP: (!) 139/58 (12/09/20 0530)  SpO2: 96 % (12/09/20 0530) Vital Signs (24h Range):  Temp:  [98.7 °F (37.1 °C)] 98.7 °F (37.1 °C)  Pulse:  [54-67] 54  Resp:  [17-18] 18  SpO2:  [96 %-99 %] 96 %  BP: (130-154)/() 139/58     Weight: 119.3 kg (263 lb)  Body mass index is 36.68 kg/m².  No intake or output data in the 24 hours ending 12/09/20 1145   Physical Exam  Vitals signs and nursing note reviewed.   Constitutional:       Appearance: Normal appearance.   HENT:      Head: Normocephalic and atraumatic.      Nose: Nose normal.      Mouth/Throat:      Mouth: Mucous membranes are moist.   Eyes:      Extraocular Movements: Extraocular movements intact.      Pupils: Pupils are equal, round, and reactive to light.   Neck:      Musculoskeletal: Normal range of motion and neck supple.   Cardiovascular:      Rate and Rhythm: Normal rate and regular rhythm.   Pulmonary:      Effort: Pulmonary effort is normal.      Breath sounds: Normal breath sounds.   Abdominal:      General: Bowel sounds are normal. There is no distension.      Tenderness: There is no abdominal tenderness. There is no guarding.   Musculoskeletal: Normal range of motion.   Skin:     General: Skin is warm.   Neurological:      General: No focal deficit present.      Mental Status: He is alert and oriented to person, place, and time.   Psychiatric:         Mood and Affect: Mood normal.         Significant Labs:   Recent Lab Results       12/09/20  0451   12/08/20  2236   12/08/20  2224   12/08/20  2220   12/08/20  2214        Albumin         4.3     Alkaline Phosphatase          38     ALT         22     Anion Gap         7     aPTT 31.8  Comment:  Therapeutic Range of 67.5-105.1 secs.  Equivalent to Heparin Concentration of anti-Xa 0.3-0.7 IU/ml.               AST         25     Baso #         0.03     Basophil %         0.3     BILIRUBIN TOTAL         0.6  Comment:  For infants and newborns, interpretation of results should be based  on gestational age, weight and in agreement with clinical  observations.  Premature Infant recommended reference ranges:  Up to 24 hours.............<8.0 mg/dL  Up to 48 hours............<12.0 mg/dL  3-5 days..................<15.0 mg/dL  6-29 days.................<15.0 mg/dL       BUN         20     Calcium         9.0     Chloride         104     Cholesterol         243  Comment:  The National Cholesterol Education Program (NCEP) has set the  following guidelines (reference ranges) for Cholesterol:  Optimal.....................<200 mg/dL  Borderline High.............200-239 mg/dL  High........................> or = 240 mg/dL       CO2         27     CPK MB 4.4             Creatinine         1.3     Differential Method         Automated     eGFR if          >60.0     eGFR if non          >60.0  Comment:  Calculation used to obtain the estimated glomerular filtration  rate (eGFR) is the CKD-EPI equation.        Eos #         0.1     Eosinophil %         1.2     Estimated Avg Glucose 114             Glucose         90     Gran # (ANC)         4.8     Gran %         53.6     HDL         37  Comment:  The National Cholesterol Education Program (NCEP) has set the  following guidelines (reference values) for HDL Cholesterol:  Low...............<40 mg/dL  Optimal...........>60 mg/dL       HDL/Cholesterol Ratio         15.2     Hematocrit         41.3     Hemoglobin         13.5     Hemoglobin A1C External 5.6  Comment:  According to ADA guidelines, hemoglobin A1C <7.0% represents  optimal control in non-pregnant diabetic patients.   Different  metrics may apply to specific populations.   Standards of Medical Care in Diabetes - 2016.  For the purpose of screening for the presence of diabetes:  <5.7%     Consistent with the absence of diabetes  5.7-6.4%  Consistent with increasing risk for diabetes   (prediabetes)  >or=6.5%  Consistent with diabetes  Currently no consensus exists for use of hemoglobin A1C  for diagnosis of diabetes for children.               Immature Grans (Abs)         0.03  Comment:  Mild elevation in immature granulocytes is non specific and   can be seen in a variety of conditions including stress response,   acute inflammation, trauma and pregnancy. Correlation with other   laboratory and clinical findings is essential.       Immature Granulocytes         0.3     INR         1.2  Comment:  Coumadin Therapy:  INR: 2.0-3.0 conventional anticoagulation  INR: 2.5-3.5 intensive anticoagulation       LDL Cholesterol External         160.4  Comment:  The National Cholesterol Education Program (NCEP) has set the  following guidelines (reference values) for LDL Cholesterol:  Optimal.......................<130 mg/dL  Borderline High...............130-159 mg/dL  High..........................160-189 mg/dL  Very High.....................>190 mg/dL       Lymph #         3.1     Lymph %         34.9     MCH         29.2     MCHC         32.7     MCV         89     Mono #         0.9     Mono %         9.7     MPV         9.8     Non-HDL Cholesterol         206  Comment:  Risk category and Non-HDL cholesterol goals:  Coronary heart disease (CHD)or equivalent (10-year risk of CHD >20%):  Non-HDL cholesterol goal     <130 mg/dL  Two or more CHD risk factors and 10-year risk of CHD <= 20%:  Non-HDL cholesterol goal     <160 mg/dL  0 to 1 CHD risk factor:  Non-HDL cholesterol goal     <190 mg/dL       nRBC         0     Platelets         266     POC Creatinine   1.3           POC Glucose       106       Potassium         3.9     PROTEIN TOTAL          7.2     PT         14.3     RBC         4.63     RDW         12.1     Sample   VENOUS           SARS-CoV-2 RNA, Amplification, Qual     Negative  Comment:  This test utilizes isothermal nucleic acid amplification   technology to detect the SARS-CoV-2 RdRp nucleic acid segment.   The analytical sensitivity (limit of detection) is 125 genome   equivalents/mL.   A POSITIVE result implies infection with the SARS-CoV-2 virus;  the patient is presumed to be contagious.    A NEGATIVE result means that SARS-CoV-2 nucleic acids are not  present above the limit of detection. A NEGATIVE result should be   treated as presumptive. It does not rule out the possibility of   COVID-19 and should not be the sole basis for treatment decisions.   If COVID-19 is strongly suspected based on clinical and exposure   history, re-testing using an alternate molecular assay should be   considered.   This test is only for use under the Food and Drug   Administration s Emergency Use Authorization (EUA).   Commercial kits are provided by [employer].   Performance characteristics of the EUA have been independently  verified by [facility] Department of  Pathology and Laboratory Medicine.   _________________________________________________________________  The ID NOW COVID-19 Letter of Authorization, along with the   authorized Fact Sheet for Healthcare Providers, the authorized Fact  Sheet for Patients, and authorized labeling are available on the FDA   website:  www.fda.gov/MedicalDevices/Safety/EmergencySituations/kmu301661.htm           Sodium         138     Total Cholesterol/HDL Ratio         6.6     Triglycerides         228  Comment:  The National Cholesterol Education Program (NCEP) has set the  following guidelines (reference values) for triglycerides:  Normal......................<150 mg/dL  Borderline High.............150-199 mg/dL  High........................200-499 mg/dL       Troponin I <0.030              TSH         4.560     WBC         8.93                          Significant Imaging: I have reviewed all pertinent imaging results/findings within the past 24 hours.      Assessment/Plan:    12/9/2020  A)  Pt has had resolution of the right and neck arm numbness that caused him concern  P)  Continue neurology work up  No notes have been filed under this hospital service.  Service: Hospital Medicine    VTE Risk Mitigation (From admission, onward)         Ordered     IP VTE HIGH RISK PATIENT  Once      12/09/20 0031     Place sequential compression device  Until discontinued      12/09/20 0031                Discharge Planning   LIS:      Code Status: Full Code   Is the patient medically ready for discharge?:     Reason for patient still in hospital (select all that apply): Patient new problem, Treatment and Consult recommendations                     Héctor Chacko MD  Department of Hospital Medicine   [facility]

## 2020-12-09 NOTE — HPI
Sharif Olsen is a 35 y.o. old  male who  has no past medical history on file.. The patient presented to [facility] on 12/8/2020 with a primary complaint of Arm Numbness (reports right arm numbess approx 1 hour ago that resolved PTA. Denies weakness, headache, cp, SOB or blurred vision at present. pt reports taking 2 baby aspirin PTA. )        35-year-old male presented emergency department with right upper extremity weakness and numbness and could not  the right arm about 1 hour PTA and the symptoms lasted few minutes and spontaneously resolved.       Patient said he felt funny in his head and also felt funny in his right lower extremity when he had weakness in the right upper extremity.  Patient said he took two baby aspirins.  Patient denies any other complaints.       Patient states he is back to normal at this time and has no weakness or numbness at this time.  Denies headache or nausea vomiting or chest pain or shortness of breath or abdominal pain.  Denies trauma.  Denies any residual symptoms at this time.       Patient said he smokes and use marijuana daily and he has family history of strokes and ASD in his father and daughter

## 2020-12-09 NOTE — SUBJECTIVE & OBJECTIVE
Interval History: Pt has had resolution of the right arm and neck numbness.    Review of Systems   Constitutional: Positive for fatigue.   HENT: Negative.    Eyes: Negative.    Respiratory: Negative.    Cardiovascular: Negative.    Gastrointestinal: Negative.    Endocrine: Negative.    Genitourinary: Negative.    Musculoskeletal: Negative.    Skin: Negative.    Allergic/Immunologic: Negative.    Neurological: Negative.    Hematological: Negative.    Psychiatric/Behavioral: Negative.      Objective:     Vital Signs (Most Recent):  Temp: 98.7 °F (37.1 °C) (12/08/20 2147)  Pulse: (!) 54 (12/09/20 0530)  Resp: 18 (12/09/20 0530)  BP: (!) 139/58 (12/09/20 0530)  SpO2: 96 % (12/09/20 0530) Vital Signs (24h Range):  Temp:  [98.7 °F (37.1 °C)] 98.7 °F (37.1 °C)  Pulse:  [54-67] 54  Resp:  [17-18] 18  SpO2:  [96 %-99 %] 96 %  BP: (130-154)/() 139/58     Weight: 119.3 kg (263 lb)  Body mass index is 36.68 kg/m².  No intake or output data in the 24 hours ending 12/09/20 1145   Physical Exam  Vitals signs and nursing note reviewed.   Constitutional:       Appearance: Normal appearance.   HENT:      Head: Normocephalic and atraumatic.      Nose: Nose normal.      Mouth/Throat:      Mouth: Mucous membranes are moist.   Eyes:      Extraocular Movements: Extraocular movements intact.      Pupils: Pupils are equal, round, and reactive to light.   Neck:      Musculoskeletal: Normal range of motion and neck supple.   Cardiovascular:      Rate and Rhythm: Normal rate and regular rhythm.   Pulmonary:      Effort: Pulmonary effort is normal.      Breath sounds: Normal breath sounds.   Abdominal:      General: Bowel sounds are normal. There is no distension.      Tenderness: There is no abdominal tenderness. There is no guarding.   Musculoskeletal: Normal range of motion.   Skin:     General: Skin is warm.   Neurological:      General: No focal deficit present.      Mental Status: He is alert and oriented to person, place, and time.    Psychiatric:         Mood and Affect: Mood normal.         Significant Labs:   Recent Lab Results       12/09/20  0451   12/08/20  2236   12/08/20  2224   12/08/20  2220   12/08/20  2214        Albumin         4.3     Alkaline Phosphatase         38     ALT         22     Anion Gap         7     aPTT 31.8  Comment:  Therapeutic Range of 67.5-105.1 secs.  Equivalent to Heparin Concentration of anti-Xa 0.3-0.7 IU/ml.               AST         25     Baso #         0.03     Basophil %         0.3     BILIRUBIN TOTAL         0.6  Comment:  For infants and newborns, interpretation of results should be based  on gestational age, weight and in agreement with clinical  observations.  Premature Infant recommended reference ranges:  Up to 24 hours.............<8.0 mg/dL  Up to 48 hours............<12.0 mg/dL  3-5 days..................<15.0 mg/dL  6-29 days.................<15.0 mg/dL       BUN         20     Calcium         9.0     Chloride         104     Cholesterol         243  Comment:  The National Cholesterol Education Program (NCEP) has set the  following guidelines (reference ranges) for Cholesterol:  Optimal.....................<200 mg/dL  Borderline High.............200-239 mg/dL  High........................> or = 240 mg/dL       CO2         27     CPK MB 4.4             Creatinine         1.3     Differential Method         Automated     eGFR if          >60.0     eGFR if non          >60.0  Comment:  Calculation used to obtain the estimated glomerular filtration  rate (eGFR) is the CKD-EPI equation.        Eos #         0.1     Eosinophil %         1.2     Estimated Avg Glucose 114             Glucose         90     Gran # (ANC)         4.8     Gran %         53.6     HDL         37  Comment:  The National Cholesterol Education Program (NCEP) has set the  following guidelines (reference values) for HDL Cholesterol:  Low...............<40 mg/dL  Optimal...........>60 mg/dL        HDL/Cholesterol Ratio         15.2     Hematocrit         41.3     Hemoglobin         13.5     Hemoglobin A1C External 5.6  Comment:  According to ADA guidelines, hemoglobin A1C <7.0% represents  optimal control in non-pregnant diabetic patients.  Different  metrics may apply to specific populations.   Standards of Medical Care in Diabetes - 2016.  For the purpose of screening for the presence of diabetes:  <5.7%     Consistent with the absence of diabetes  5.7-6.4%  Consistent with increasing risk for diabetes   (prediabetes)  >or=6.5%  Consistent with diabetes  Currently no consensus exists for use of hemoglobin A1C  for diagnosis of diabetes for children.               Immature Grans (Abs)         0.03  Comment:  Mild elevation in immature granulocytes is non specific and   can be seen in a variety of conditions including stress response,   acute inflammation, trauma and pregnancy. Correlation with other   laboratory and clinical findings is essential.       Immature Granulocytes         0.3     INR         1.2  Comment:  Coumadin Therapy:  INR: 2.0-3.0 conventional anticoagulation  INR: 2.5-3.5 intensive anticoagulation       LDL Cholesterol External         160.4  Comment:  The National Cholesterol Education Program (NCEP) has set the  following guidelines (reference values) for LDL Cholesterol:  Optimal.......................<130 mg/dL  Borderline High...............130-159 mg/dL  High..........................160-189 mg/dL  Very High.....................>190 mg/dL       Lymph #         3.1     Lymph %         34.9     MCH         29.2     MCHC         32.7     MCV         89     Mono #         0.9     Mono %         9.7     MPV         9.8     Non-HDL Cholesterol         206  Comment:  Risk category and Non-HDL cholesterol goals:  Coronary heart disease (CHD)or equivalent (10-year risk of CHD >20%):  Non-HDL cholesterol goal     <130 mg/dL  Two or more CHD risk factors and 10-year risk of CHD <= 20%:  Non-HDL  cholesterol goal     <160 mg/dL  0 to 1 CHD risk factor:  Non-HDL cholesterol goal     <190 mg/dL       nRBC         0     Platelets         266     POC Creatinine   1.3           POC Glucose       106       Potassium         3.9     PROTEIN TOTAL         7.2     PT         14.3     RBC         4.63     RDW         12.1     Sample   VENOUS           SARS-CoV-2 RNA, Amplification, Qual     Negative  Comment:  This test utilizes isothermal nucleic acid amplification   technology to detect the SARS-CoV-2 RdRp nucleic acid segment.   The analytical sensitivity (limit of detection) is 125 genome   equivalents/mL.   A POSITIVE result implies infection with the SARS-CoV-2 virus;  the patient is presumed to be contagious.    A NEGATIVE result means that SARS-CoV-2 nucleic acids are not  present above the limit of detection. A NEGATIVE result should be   treated as presumptive. It does not rule out the possibility of   COVID-19 and should not be the sole basis for treatment decisions.   If COVID-19 is strongly suspected based on clinical and exposure   history, re-testing using an alternate molecular assay should be   considered.   This test is only for use under the Food and Drug   Administration s Emergency Use Authorization (EUA).   Commercial kits are provided by [employer].   Performance characteristics of the EUA have been independently  verified by [facility] Department of  Pathology and Laboratory Medicine.   _________________________________________________________________  The ID NOW COVID-19 Letter of Authorization, along with the   authorized Fact Sheet for Healthcare Providers, the authorized Fact  Sheet for Patients, and authorized labeling are available on the FDA   website:  www.fda.gov/MedicalDevices/Safety/EmergencySituations/ljy297268.htm           Sodium         138     Total Cholesterol/HDL Ratio         6.6     Triglycerides         228  Comment:  The National Cholesterol Education  Program (NCEP) has set the  following guidelines (reference values) for triglycerides:  Normal......................<150 mg/dL  Borderline High.............150-199 mg/dL  High........................200-499 mg/dL       Troponin I <0.030             TSH         4.560     WBC         8.93                          Significant Imaging: I have reviewed all pertinent imaging results/findings within the past 24 hours.

## 2020-12-10 LAB
AORTIC ROOT ANNULUS: 3.14 CM
AORTIC VALVE CUSP SEPERATION: 2.42 CM
AV INDEX (PROSTH): 1.16
AV MEAN GRADIENT: 3 MMHG
AV PEAK GRADIENT: 6 MMHG
AV VALVE AREA: 4.39 CM2
AV VELOCITY RATIO: 97.77
BSA FOR ECHO PROCEDURE: 2.44 M2
CV ECHO LV RWT: 0.36 CM
DOP CALC AO PEAK VEL: 1.24 M/S
DOP CALC AO VTI: 22.68 CM
DOP CALC LVOT AREA: 3.8 CM2
DOP CALC LVOT DIAMETER: 2.2 CM
DOP CALC LVOT PEAK VEL: 121.24 M/S
DOP CALC LVOT STROKE VOLUME: 99.66 CM3
DOP CALCLVOT PEAK VEL VTI: 26.23 CM
E WAVE DECELERATION TIME: 292.57 MSEC
E/A RATIO: 1
E/E' RATIO: 3.37 M/S
ECHO LV POSTERIOR WALL: 0.98 CM (ref 0.6–1.1)
FRACTIONAL SHORTENING: 32 % (ref 28–44)
INTERVENTRICULAR SEPTUM: 0.98 CM (ref 0.6–1.1)
LEFT ATRIUM SIZE: 3.66 CM
LEFT INTERNAL DIMENSION IN SYSTOLE: 3.74 CM (ref 2.1–4)
LEFT VENTRICLE DIASTOLIC VOLUME INDEX: 54.82 ML/M2
LEFT VENTRICLE DIASTOLIC VOLUME: 129.88 ML
LEFT VENTRICLE MASS INDEX: 88 G/M2
LEFT VENTRICLE SYSTOLIC VOLUME INDEX: 25.4 ML/M2
LEFT VENTRICLE SYSTOLIC VOLUME: 60.13 ML
LEFT VENTRICULAR INTERNAL DIMENSION IN DIASTOLE: 5.52 CM (ref 3.5–6)
LEFT VENTRICULAR MASS: 208.86 G
LV LATERAL E/E' RATIO: 2.95 M/S
LV SEPTAL E/E' RATIO: 3.93 M/S
MV PEAK A VEL: 0.59 M/S
MV PEAK E VEL: 0.59 M/S
PISA TR MAX VEL: 2.22 M/S
PV PEAK VELOCITY: 103.64 CM/S
RA PRESSURE: 3 MMHG
TDI LATERAL: 0.2 M/S
TDI SEPTAL: 0.15 M/S
TDI: 0.18 M/S
TR MAX PG: 20 MMHG
TV REST PULMONARY ARTERY PRESSURE: 23 MMHG

## 2020-12-11 ENCOUNTER — OFFICE VISIT (OUTPATIENT)
Dept: FAMILY MEDICINE | Facility: CLINIC | Age: 35
End: 2020-12-11
Payer: COMMERCIAL

## 2020-12-11 ENCOUNTER — TELEPHONE (OUTPATIENT)
Dept: CARDIOLOGY | Facility: CLINIC | Age: 35
End: 2020-12-11

## 2020-12-11 VITALS
HEART RATE: 71 BPM | BODY MASS INDEX: 37.56 KG/M2 | TEMPERATURE: 98 F | SYSTOLIC BLOOD PRESSURE: 102 MMHG | HEIGHT: 71 IN | WEIGHT: 268.31 LBS | OXYGEN SATURATION: 95 % | DIASTOLIC BLOOD PRESSURE: 74 MMHG

## 2020-12-11 DIAGNOSIS — I50.20 HFREF (HEART FAILURE WITH REDUCED EJECTION FRACTION): ICD-10-CM

## 2020-12-11 DIAGNOSIS — G45.9 TIA (TRANSIENT ISCHEMIC ATTACK): ICD-10-CM

## 2020-12-11 DIAGNOSIS — Z09 HOSPITAL DISCHARGE FOLLOW-UP: Primary | ICD-10-CM

## 2020-12-11 PROCEDURE — 3008F BODY MASS INDEX DOCD: CPT | Mod: CPTII,S$GLB,, | Performed by: STUDENT IN AN ORGANIZED HEALTH CARE EDUCATION/TRAINING PROGRAM

## 2020-12-11 PROCEDURE — 99999 PR PBB SHADOW E&M-EST. PATIENT-LVL IV: ICD-10-PCS | Mod: PBBFAC,,, | Performed by: STUDENT IN AN ORGANIZED HEALTH CARE EDUCATION/TRAINING PROGRAM

## 2020-12-11 PROCEDURE — 99204 PR OFFICE/OUTPT VISIT, NEW, LEVL IV, 45-59 MIN: ICD-10-PCS | Mod: S$GLB,,, | Performed by: STUDENT IN AN ORGANIZED HEALTH CARE EDUCATION/TRAINING PROGRAM

## 2020-12-11 PROCEDURE — 99204 OFFICE O/P NEW MOD 45 MIN: CPT | Mod: S$GLB,,, | Performed by: STUDENT IN AN ORGANIZED HEALTH CARE EDUCATION/TRAINING PROGRAM

## 2020-12-11 PROCEDURE — 99999 PR PBB SHADOW E&M-EST. PATIENT-LVL IV: CPT | Mod: PBBFAC,,, | Performed by: STUDENT IN AN ORGANIZED HEALTH CARE EDUCATION/TRAINING PROGRAM

## 2020-12-11 PROCEDURE — 1126F PR PAIN SEVERITY QUANTIFIED, NO PAIN PRESENT: ICD-10-PCS | Mod: S$GLB,,, | Performed by: STUDENT IN AN ORGANIZED HEALTH CARE EDUCATION/TRAINING PROGRAM

## 2020-12-11 PROCEDURE — 1126F AMNT PAIN NOTED NONE PRSNT: CPT | Mod: S$GLB,,, | Performed by: STUDENT IN AN ORGANIZED HEALTH CARE EDUCATION/TRAINING PROGRAM

## 2020-12-11 PROCEDURE — 3008F PR BODY MASS INDEX (BMI) DOCUMENTED: ICD-10-PCS | Mod: CPTII,S$GLB,, | Performed by: STUDENT IN AN ORGANIZED HEALTH CARE EDUCATION/TRAINING PROGRAM

## 2020-12-11 RX ORDER — ATORVASTATIN CALCIUM 40 MG/1
40 TABLET, FILM COATED ORAL DAILY
Qty: 90 TABLET | Refills: 3 | Status: SHIPPED | OUTPATIENT
Start: 2020-12-11 | End: 2021-12-11

## 2020-12-11 RX ORDER — SACUBITRIL AND VALSARTAN 24; 26 MG/1; MG/1
1 TABLET, FILM COATED ORAL 2 TIMES DAILY
Qty: 60 TABLET | Refills: 0 | Status: SHIPPED | OUTPATIENT
Start: 2020-12-11 | End: 2021-01-11 | Stop reason: SDUPTHER

## 2020-12-11 NOTE — TELEPHONE ENCOUNTER
Patient has appointment  with another cardiologist   Needs to see the Neuro [facility] said 2-3 business days for him to be seen

## 2020-12-11 NOTE — PATIENT INSTRUCTIONS
No caffeine, no more marijuana.     I recommend a heart healthy diet rich in fiber, fresh vegetables and fruit and low in saturated fats (fried foods, red meat, etc.).  The Mediterranean diet is probably the best for your health with a plant based diet high in nuts, beans, olive oil and a few servings a week of fish. You do not need to eat a meat at every meal to be healthy!        What is Heart Failure?  The heart is a muscle that pumps oxygen-rich blood to all parts of the body. When you have heart failure, the heart is not able to pump as well as it should. Blood and fluid may back up into the lungs, and some parts of the body dont get enough oxygen-rich blood to work normally. These problems lead to the symptoms you feel.  When you have heart failure  With heart failure, not enough oxygen-rich blood leaves the heart with each beat. There are 2 types of heart failure. Both affect the ventricles ability to pump blood. You may have 1 or both types.       Systolic heart failure. The heart muscle becomes weak and enlarged. It cant pump enough oxygen-rich blood forward to the rest of the body when the ventricles contract. The measurement of how much blood your heart pushes out when it beats is called ejection fraction. In systolic heart failure, the ejection fraction is lower than normal. This can cause blood to back up into the lungs and cause shortness of breath and eventually ankle swelling (edema).  This is also called heart failure with reduced ejection fraction, or  HFrEF.    Diastolic heart failure. The heart muscle becomes stiff. It doesnt relax normally between contractions, which keeps the ventricles from filling with blood. Ejection fraction is often in the normal range. This can still lead to the backup of blood into the body and affect the organs such as the liver. This is also called heart failure with preserved ejection fraction or HFpEF.     Recognizing heart failure symptoms  When you have heart  failure, you need to pay close attention to your body and how you feel, every single day. That way, if a problem occurs, you can get help before it becomes too severe. You'll need to watch for changes in your symptoms. As long as symptoms stay about the same from one day to the next, your heart failure is stable. But if symptoms start to get worse, it's time to take action.  Signs and symptoms of worsening heart failure include:  · Rapid weight gain  · Shortness of breath  · Swelling (edema)  · Fatigue  How heart failure affects your body  When the heart doesn't pump enough blood, hormones (body chemicals) are sent to increase the amount of work the heart does. Some hormones make the heart grow larger. Others tell the heart to pump faster. As a result, the heart may pump more blood at first, but it can't keep up with the ongoing demands. So, the heart muscle becomes even more weak. Over time, even less blood is pumped through the heart. This leads to problems throughout the body as organs began to feel the effects of a long-term lack of oxygen. Eventually, if untreated, this can cause problems with your lungs, liver, kidneys, and loss of elasticity in the skin, and skin changes in the lower legs. A weak heart itself can eventually cause a severe decline in health and possible death if left untreated.  What is ejection fraction?  Ejection fraction (EF) measures how much blood the heart pumps out (ejects). This is measured to help diagnose heart failure. A healthy heart pumps at least half of the blood from the ventricles with each beat. This means a normal ejection fraction is around 50% to 70%. Your doctor will calculate ejection fraction from an echocardiogram.     My ejection fraction     Date: ______________________  Ejection fraction: _____________  Test used: __________________  Date Last Reviewed: 3/15/2016  © 1390-1549 [employer]. [address] [place] [id number]. All rights reserved.  This information is not intended as a substitute for professional medical care. Always follow your healthcare professional's instructions.

## 2020-12-11 NOTE — PROGRESS NOTES
"[facility] MEDICINE CLINIC NOTE    Patient Name: Sharif Olsen  YOB: 1985    PRESENTING HISTORY   Chief Complaint:   Chief Complaint   Patient presents with    Hospital Follow Up     TIA      New patient to me  History of Present Illness:  Mr. Sharif Olsen is a 35 y.o. male her for hospital follow up.     He was driving and suddenly his right arm went numb. He had confusion and difficulty speaking. Went into gas station and bought aspirin due to signs of stroke.   Lasted about 15 seconds before resolving completely.     Went to ED and admitted. Workup in progress and he left AMA. He had already had Echo with bubble.   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 No significant change in activity.      In retrospect, recently he has been feeling:  Tired, yawning a lot  Breathing " a little heavier"  Sleeps on one pillow at night, small.   No leg swelling.   + Coughing. No sputum   No PND or orthopnea  Sinus infection a few weeks ago when weather switched. Took Delsym and it resolved  Under stress recently. Family drama, COVID, work. Grandma has COVID. This is stressful.     Smokes marijuana daily.   No cocaine.   Energy drinks- 1 / day. Monster Tea    PMHx: None  Surg: None  Fhx: CVA x 3 in father. Age 60 andrew. Grandmother DM, Mother HTN  Social: EtOH very occasional. Maybe once/month    Review of Systems   Constitutional: Negative for chills and fever.   HENT: Negative for hearing loss and tinnitus.    Eyes: Negative for double vision.   Respiratory: Positive for cough. Negative for sputum production, shortness of breath and wheezing.    Cardiovascular: Negative for chest pain, palpitations, orthopnea, leg swelling and PND.   Gastrointestinal: Negative for abdominal pain, nausea and vomiting.   Genitourinary: Negative for dysuria and hematuria.   Musculoskeletal: Negative for joint pain and myalgias.   Skin: Negative for itching and rash.   Neurological: Positive for " sensory change (resolved) and focal weakness (resolved). Negative for loss of consciousness.         PAST HISTORY:   No past medical history    History reviewed. No pertinent surgical history.    Family History   Problem Relation Age of Onset    Hypertension Mother     Hypertension Father     Stroke Father     Atrial Septal Defect Father        Social History     Socioeconomic History    Marital status:      Spouse name: Not on file    Number of children: Not on file    Years of education: Not on file    Highest education level: Not on file   Occupational History    Not on file   Social Needs    Financial resource strain: Not on file    Food insecurity     Worry: Not on file     Inability: Not on file    Transportation needs     Medical: Not on file     Non-medical: Not on file   Tobacco Use    Smoking status: Former Smoker   Substance and Sexual Activity    Alcohol use: Not Currently    Drug use: Yes     Types: Marijuana     Comment: daily    Sexual activity: Not on file   Lifestyle    Physical activity     Days per week: Not on file     Minutes per session: Not on file    Stress: Not on file   Relationships    Social connections     Talks on phone: Not on file     Gets together: Not on file     Attends Islam service: Not on file     Active member of club or organization: Not on file     Attends meetings of clubs or organizations: Not on file     Relationship status: Not on file   Other Topics Concern    Not on file   Social History Narrative    Not on file       MEDICATIONS & ALLERGIES:     Current Outpatient Medications on File Prior to Visit   Medication Sig    aspirin 325 MG tablet Take 325 mg by mouth once daily.     No current facility-administered medications on file prior to visit.        Review of patient's allergies indicates:  No Known Allergies    OBJECTIVE:   Vital Signs:  Vitals:    12/11/20 1448   BP: 102/74   Pulse: 71   Temp: 98 °F (36.7 °C)   SpO2: 95%   Weight:  "121.7 kg (268 lb 4.8 oz)   Height: 5' 11" (1.803 m)       No results found for this or any previous visit (from the past 24 hour(s)).      Physical Exam   Constitutional: He is oriented to person, place, and time and well-developed, well-nourished, and in no distress.   HENT:   Head: Normocephalic and atraumatic.   Right Ear: External ear normal.   Left Ear: External ear normal.   Eyes: Pupils are equal, round, and reactive to light. Conjunctivae and EOM are normal. No scleral icterus.   Neck: Normal range of motion. Neck supple. No thyromegaly present.   Cardiovascular: Normal rate, regular rhythm and normal heart sounds.   No murmur heard.  Pulmonary/Chest: Effort normal and breath sounds normal. No respiratory distress. He has no wheezes. He has no rales.   Musculoskeletal: Normal range of motion.         General: No tenderness, deformity or edema.   Lymphadenopathy:     He has no cervical adenopathy.   Neurological: He is alert and oriented to person, place, and time. Gait normal. GCS score is 15.   Skin: Skin is warm and dry. No rash noted. He is not diaphoretic. No erythema.   Psychiatric: Mood, memory, affect and judgment normal.   Nursing note and vitals reviewed.      ASSESSMENT & PLAN:     35 M here for hospital f/u.     TIA- on .  Has Neurology f/u next week on the 15th. WIll order Holter. Could have Afib in setting of HFrEF.     HFrEF- severe. My best guess is viral NICM. Will need ischemia evaluation. Cardiology appointment scheduled on the 16th.    Starting on GDMT with entresto today. Will also need B blocker added.    Would like Cardiology opinion on whether a life vest would be a good idea with such severely depressed EF.    Hospital discharge follow-up    TIA (transient ischemic attack)  -     Holter monitor - 48 hour; Future    HFrEF (heart failure with reduced ejection fraction)  -     atorvastatin (LIPITOR) 40 MG tablet; Take 1 tablet (40 mg total) by mouth once daily.  Dispense: 90 " tablet; Refill: 3  -     sacubitriL-valsartan (ENTRESTO) 24-26 mg per tablet; Take 1 tablet by mouth 2 (two) times daily.  Dispense: 60 tablet; Refill: 0    Patient has severe HFrEF and would have been better to stay in hospital. I stressed the severity of his condition and that he must follow up as scheduled with specialists. Return to ED for anything concerning.      Hany Sinclair MD

## 2020-12-11 NOTE — TELEPHONE ENCOUNTER
----- Message from Libia Guillaume sent at 12/11/2020  8:38 AM CST -----  Regarding: follow up visit  [phone] patient was discharge from hospital and needing to see a cardiologist asap please give patient a call he had a mini stroke this is for a follow up visit patient said he would like to be seen today 12/11

## 2020-12-16 ENCOUNTER — OFFICE VISIT (OUTPATIENT)
Dept: CARDIOLOGY | Facility: CLINIC | Age: 35
End: 2020-12-16
Payer: COMMERCIAL

## 2020-12-16 ENCOUNTER — CLINICAL SUPPORT (OUTPATIENT)
Dept: CARDIOLOGY | Facility: HOSPITAL | Age: 35
End: 2020-12-16
Attending: STUDENT IN AN ORGANIZED HEALTH CARE EDUCATION/TRAINING PROGRAM
Payer: COMMERCIAL

## 2020-12-16 VITALS
WEIGHT: 267 LBS | DIASTOLIC BLOOD PRESSURE: 108 MMHG | RESPIRATION RATE: 18 BRPM | OXYGEN SATURATION: 97 % | HEIGHT: 71 IN | HEART RATE: 73 BPM | SYSTOLIC BLOOD PRESSURE: 140 MMHG | BODY MASS INDEX: 37.38 KG/M2

## 2020-12-16 DIAGNOSIS — G45.9 TIA (TRANSIENT ISCHEMIC ATTACK): Primary | ICD-10-CM

## 2020-12-16 DIAGNOSIS — E78.2 MIXED HYPERLIPIDEMIA: ICD-10-CM

## 2020-12-16 DIAGNOSIS — E66.01 SEVERE OBESITY (BMI >= 40): ICD-10-CM

## 2020-12-16 DIAGNOSIS — R29.898 WEAKNESS OF RIGHT UPPER EXTREMITY: ICD-10-CM

## 2020-12-16 DIAGNOSIS — G45.9 TIA (TRANSIENT ISCHEMIC ATTACK): ICD-10-CM

## 2020-12-16 DIAGNOSIS — F12.90 MARIJUANA USE: ICD-10-CM

## 2020-12-16 DIAGNOSIS — I10 ESSENTIAL HYPERTENSION: ICD-10-CM

## 2020-12-16 PROCEDURE — 1126F AMNT PAIN NOTED NONE PRSNT: CPT | Mod: S$GLB,,, | Performed by: INTERNAL MEDICINE

## 2020-12-16 PROCEDURE — 3008F BODY MASS INDEX DOCD: CPT | Mod: CPTII,S$GLB,, | Performed by: INTERNAL MEDICINE

## 2020-12-16 PROCEDURE — 3008F PR BODY MASS INDEX (BMI) DOCUMENTED: ICD-10-PCS | Mod: CPTII,S$GLB,, | Performed by: INTERNAL MEDICINE

## 2020-12-16 PROCEDURE — 99204 OFFICE O/P NEW MOD 45 MIN: CPT | Mod: S$GLB,,, | Performed by: INTERNAL MEDICINE

## 2020-12-16 PROCEDURE — 93227 XTRNL ECG REC<48 HR R&I: CPT | Mod: ,,, | Performed by: INTERNAL MEDICINE

## 2020-12-16 PROCEDURE — 93227 HOLTER MONITOR - 48 HOUR (CUPID ONLY): ICD-10-PCS | Mod: ,,, | Performed by: INTERNAL MEDICINE

## 2020-12-16 PROCEDURE — 93000 EKG 12-LEAD: ICD-10-PCS | Mod: S$GLB,,, | Performed by: INTERNAL MEDICINE

## 2020-12-16 PROCEDURE — 1126F PR PAIN SEVERITY QUANTIFIED, NO PAIN PRESENT: ICD-10-PCS | Mod: S$GLB,,, | Performed by: INTERNAL MEDICINE

## 2020-12-16 PROCEDURE — 93226 XTRNL ECG REC<48 HR SCAN A/R: CPT

## 2020-12-16 PROCEDURE — 93000 ELECTROCARDIOGRAM COMPLETE: CPT | Mod: S$GLB,,, | Performed by: INTERNAL MEDICINE

## 2020-12-16 PROCEDURE — 99204 PR OFFICE/OUTPT VISIT, NEW, LEVL IV, 45-59 MIN: ICD-10-PCS | Mod: S$GLB,,, | Performed by: INTERNAL MEDICINE

## 2020-12-16 NOTE — ASSESSMENT & PLAN NOTE
Maintain low-fat low-cholesterol diet continue on Lipitor 40 mg a day recheck his lipids and liver profile in 3 months time to the primary care physician.  The goal is to bring his LDL cholesterol well below hydrated ideally below 70

## 2020-12-16 NOTE — PROGRESS NOTES
Subjective:    Patient ID:  Sharif Olsen is a 35 y.o. male patient here for evaluation Hyperlipidemia, Transient Ischemic Attack, and Hypertension (check echo has 6 % EF )      History of Present Illness:     Patient is a 35-year-old gentleman who has been doing fairly well developed right upper extremity weakness while he was worsening his are are.  Of the last about 10-15 seconds and subsequently subsided.  He did not have any associated weakness in the lower extremities or any other muscles periods about the same time he also had some paresthesias on the right side his neck.  This again subsided spontaneously he was seen in the emergency room had initial evaluation and our echocardiogram was notable to have significant LV dysfunction possibly secondary omission of a digit.  Clinically he is doing much better he modified his diet loss about 6 lb cut out in salt and fatty foods from his diet.  He is actually feeling better no focal weakness no numbness no double vision no headaches  Will has are been use to drink him on stress.  That particular day are he had a monster +are soft drinks including Coke and other caffeinated products.  And his blood pressure was elevated on initial workup in the emergency room.  His LDL cholesterol was noted be 160 and total cholesterol is 240    Today his related some are blood work as well as arm he noted some of vague symptoms in his hand.  But no weakness  From a cardiac perspective denies having chest discomfort arm neck or jaw pain and no cough or congestion no fevers chills no nausea vomiting more recently he was started on Entresto based primary care physician after noted some blood pressures                yReview of patient's allergies indicates:  No Known Allergies    History reviewed. No pertinent past medical history.  History reviewed. No pertinent surgical history.  Social History     Tobacco Use    Smoking status: Former Smoker   Substance Use Topics    Alcohol use:  Not Currently    Drug use: Yes     Types: Marijuana     Comment: daily        Review of Systems   As noted in HPI in addition     Constitutional: Negative for chills, fatigue and fever.   Eyes: No double vision, No blurred vision  Neuro: No headaches, No dizziness  Respiratory: Negative for cough, shortness of breath and wheezing.    Cardiovascular: Negative for chest pain. Negative for palpitations and leg swelling.   Gastrointestinal: Negative for abdominal pain, No melena, diarrhea, nausea and vomiting.   Genitourinary: Negative for dysuria and frequency, Negative for hematuria  Skin: Negative for bruising, Negative for edema or discoloration noted.   Endocrine: Negative for polyphagia, Negative for heat intolerance, Negative for cold intolerance  Psychiatric: Negative for depression, Negative for anxiety, Negative for memory loss  Musculoskeletal: Negative for neck pain, Negative for muscle weakness, Negative for back pain          Objective        Vitals:    12/16/20 1553   BP: (!) 140/108   Pulse: 73   Resp: 18     124/100 repeat left upper extremity.  LIPIDS - LAST 2   Lab Results   Component Value Date    CHOL 243 (H) 12/08/2020    HDL 37 (L) 12/08/2020    LDLCALC 160.4 (H) 12/08/2020    TRIG 228 (H) 12/08/2020    CHOLHDL 15.2 (L) 12/08/2020       CBC - LAST 2  Lab Results   Component Value Date    WBC 8.93 12/08/2020    RBC 4.63 12/08/2020    HGB 13.5 (L) 12/08/2020    HCT 41.3 12/08/2020    MCV 89 12/08/2020    MCH 29.2 12/08/2020    MCHC 32.7 12/08/2020    RDW 12.1 12/08/2020     12/08/2020    MPV 9.8 12/08/2020    GRAN 4.8 12/08/2020    GRAN 53.6 12/08/2020    LYMPH 3.1 12/08/2020    LYMPH 34.9 12/08/2020    MONO 0.9 12/08/2020    MONO 9.7 12/08/2020    BASO 0.03 12/08/2020    NRBC 0 12/08/2020       CHEMISTRY & LIVER FUNCTION - LAST 2  Lab Results   Component Value Date     12/16/2020     12/08/2020    K 4.0 12/16/2020    K 3.9 12/08/2020     12/16/2020     12/08/2020     CO2 26 12/16/2020    CO2 27 12/08/2020    ANIONGAP 8 12/16/2020    ANIONGAP 7 (L) 12/08/2020    BUN 14 12/16/2020    BUN 20 12/08/2020    CREATININE 1.1 12/16/2020    CREATININE 1.3 12/08/2020    GLU 93 12/16/2020    GLU 90 12/08/2020    CALCIUM 9.2 12/16/2020    CALCIUM 9.0 12/08/2020    ALBUMIN 4.8 12/16/2020    ALBUMIN 4.3 12/08/2020    PROT 7.9 12/16/2020    PROT 7.2 12/08/2020    ALKPHOS 46 (L) 12/16/2020    ALKPHOS 38 (L) 12/08/2020    ALT 24 12/16/2020    ALT 22 12/08/2020    AST 22 12/16/2020    AST 25 12/08/2020    BILITOT 1.0 12/16/2020    BILITOT 0.6 12/08/2020        CARDIAC PROFILE - LAST 2  Lab Results   Component Value Date    CPKMB 4.4 12/09/2020    TROPONINI <0.030 12/09/2020        COAGULATION - LAST 2  Lab Results   Component Value Date    LABPT 14.3 12/08/2020    INR 1.2 12/08/2020    APTT 31.8 12/09/2020       ENDOCRINE & PSA - LAST 2  Lab Results   Component Value Date    HGBA1C 5.7 12/16/2020    HGBA1C 5.6 12/09/2020    TSH 2.530 12/16/2020    TSH 4.560 12/08/2020        ECHOCARDIOGRAM RESULTS  Results for orders placed during the hospital encounter of 12/08/20   Echo Color Flow Doppler? Yes; Bubble Contrast? Yes    Narrative · The estimated PA systolic pressure is 23 mmHg.  · There is no evidence of intracardiac shunting.  · With severely decreased systolic function. The estimated ejection   fraction is 6%.  · Grade I diastolic dysfunction.  · Normal right ventricular size with normal right ventricular systolic   function.  · Normal central venous pressure (3 mmHg).          CURRENT/PREVIOUS VISIT EKG  Results for orders placed or performed during the hospital encounter of 12/08/20   ECG 12 lead    Collection Time: 12/08/20 10:21 PM    Narrative    Test Reason : I63.9,    Vent. Rate : 062 BPM     Atrial Rate : 062 BPM     P-R Int : 182 ms          QRS Dur : 108 ms      QT Int : 394 ms       P-R-T Axes : 070 086 073 degrees     QTc Int : 399 ms      Normal sinus rhythm  Normal ECG  No previous  ECGs available  Confirmed by Sima Hodgson MD ([id number]) on 12/10/2020 8:40:33 PM    Referred By: TEDDY   SELF           Confirmed By:Sima Hodgson MD     No procedure found.   No results found for this or any previous visit.  No procedure found.          PREVIOUS STRESS TEST              PREVIOUS ANGIOGRAM        PHYSICAL EXAM    GENERAL: well built, well nourished, well-developed in no apparent distress alert and oriented.   HEENT: Normocephalic. Pupils normal and conjunctivae normal.  Mucous membranes normal, no cyanosis or icterus, trachea central,no pallor or icterus is noted..   NECK: No JVD. No bruit..   THYROID: Thyroid not enlarged. No nodules present..   CARDIAC: Regular rate and rhythm. S1 is normal.S2 is normal.No gallops, clicks or murmurs noted at this time.  CHEST ANATOMY: normal.   LUNGS: Clear to auscultation. No wheezing or rhonchi..   ABDOMEN: Soft no masses or organomegaly.  No abdomen pulsations or bruits.  Normal bowel sounds. No pulsations and no masses felt, No guarding or rebound.   EXTREMITIES: No cyanosis, clubbing or edema noted at this time., no calf tenderness bilaterally.   PERIPHERAL VASCULAR SYSTEM: Good palpable distal pulses.   CENTRAL NERVOUS SYSTEM: No focal motor or sensory deficits noted.   SKIN: Skin without lesions, moist, well perfused.   MUSCLE STRENGTH & TONE: No noteable weakness, atrophy or abnormal movement.     I HAVE REVIEWED :    The vital signs, nurses notes, and all the pertinent radiology and labs.        Current Outpatient Medications   Medication Instructions    aspirin 325 mg, Oral, Daily    atorvastatin (LIPITOR) 40 mg, Oral, Daily    sacubitriL-valsartan (ENTRESTO) 24-26 mg per tablet 1 tablet, Oral, 2 times daily          Assessment & Plan     Weakness of right upper extremity  Transient right upper extremity weakness and paresthesias.  Currently being worked up for TIA possibly cervical neuropathy cannot entirely exclude  Continue on aspirin  all the keep at 325 mg for the 1st month and then 81 mg thereafter.  Aggressive lipid management as this is a long-term issue    Hyperlipidemia  Maintain low-fat low-cholesterol diet continue on Lipitor 40 mg a day recheck his lipids and liver profile in 3 months time to the primary care physician.  The goal is to bring his LDL cholesterol well below hydrated ideally below 70    Severe obesity (BMI >= 40)  He has already started on of are weight management program with diet control arm maintain a low-fat low-cholesterol diet and restricted salt intake.  Encouraged to maintain the same    Marijuana use  Sung encouraged to refrain the use of recreational drugs at this can potentially cause a vasospastic component send and the detrimental effects long-term.    TIA (transient ischemic attack)  Management as above    Essential hypertension  In addition to maintain low-salt diet and encouraged him to keep track of his blood pressure at home are continues monitoring at least 3 to 4 times a week.  Art after 5 min of seating and a comfortable position after in the meantime his completing at a 48 hr Holter evaluation to rule out any arrhythmic events as well.  Goal would be to consider are initiating amlodipine if the LV function remains normal.    Regarding abnormal test I would review the echocardiogram again and arm reviewed the report as well.  And advised the patient based on      No follow-ups on file.

## 2020-12-16 NOTE — ASSESSMENT & PLAN NOTE
In addition to maintain low-salt diet and encouraged him to keep track of his blood pressure at home are continues monitoring at least 3 to 4 times a week.  Art after 5 min of seating and a comfortable position after in the meantime his completing at a 48 hr Holter evaluation to rule out any arrhythmic events as well.  Goal would be to consider are initiating amlodipine if the LV function remains normal.

## 2020-12-16 NOTE — ASSESSMENT & PLAN NOTE
He has already started on of are weight management program with diet control arm maintain a low-fat low-cholesterol diet and restricted salt intake.  Encouraged to maintain the same

## 2020-12-16 NOTE — ASSESSMENT & PLAN NOTE
Transient right upper extremity weakness and paresthesias.  Currently being worked up for TIA possibly cervical neuropathy cannot entirely exclude  Continue on aspirin all the keep at 325 mg for the 1st month and then 81 mg thereafter.  Aggressive lipid management as this is a long-term issue

## 2020-12-16 NOTE — ASSESSMENT & PLAN NOTE
Sung encouraged to refrain the use of recreational drugs at this can potentially cause a vasospastic component send and the detrimental effects long-term.

## 2020-12-18 LAB
OHS CV EVENT MONITOR DAY: 0
OHS CV HOLTER LENGTH DECIMAL HOURS: 48
OHS CV HOLTER LENGTH HOURS: 48
OHS CV HOLTER LENGTH MINUTES: 0

## 2020-12-21 ENCOUNTER — PATIENT MESSAGE (OUTPATIENT)
Dept: CARDIOLOGY | Facility: CLINIC | Age: 35
End: 2020-12-21

## 2021-01-11 DIAGNOSIS — I50.20 HFREF (HEART FAILURE WITH REDUCED EJECTION FRACTION): ICD-10-CM

## 2021-01-11 RX ORDER — SACUBITRIL AND VALSARTAN 24; 26 MG/1; MG/1
1 TABLET, FILM COATED ORAL 2 TIMES DAILY
Qty: 180 TABLET | Refills: 3 | Status: SHIPPED | OUTPATIENT
Start: 2021-01-11

## 2021-05-10 ENCOUNTER — PATIENT MESSAGE (OUTPATIENT)
Dept: RESEARCH | Facility: HOSPITAL | Age: 36
End: 2021-05-10

## 2022-05-31 ENCOUNTER — PATIENT MESSAGE (OUTPATIENT)
Dept: ADMINISTRATIVE | Facility: HOSPITAL | Age: 37
End: 2022-05-31
Payer: COMMERCIAL

## 2022-07-22 ENCOUNTER — OCCUPATIONAL HEALTH (OUTPATIENT)
Dept: URGENT CARE | Facility: CLINIC | Age: 37
End: 2022-07-22

## 2022-07-22 DIAGNOSIS — Z00.00 ENCOUNTER FOR PHYSICAL EXAMINATION: Primary | ICD-10-CM

## 2022-07-22 LAB — COLLECTION ONLY: NORMAL

## 2022-07-22 PROCEDURE — 99499 DOT PHYSICAL: ICD-10-PCS | Mod: S$GLB,,, | Performed by: NURSE PRACTITIONER

## 2022-07-22 PROCEDURE — 99499 UNLISTED E&M SERVICE: CPT | Mod: S$GLB,,, | Performed by: NURSE PRACTITIONER

## 2022-08-26 DIAGNOSIS — I10 ESSENTIAL HYPERTENSION: ICD-10-CM
